# Patient Record
Sex: FEMALE | Race: WHITE | NOT HISPANIC OR LATINO | Employment: OTHER | URBAN - METROPOLITAN AREA
[De-identification: names, ages, dates, MRNs, and addresses within clinical notes are randomized per-mention and may not be internally consistent; named-entity substitution may affect disease eponyms.]

---

## 2017-01-04 ENCOUNTER — PATIENT MESSAGE (OUTPATIENT)
Dept: PSYCHIATRY | Facility: CLINIC | Age: 67
End: 2017-01-04

## 2017-01-13 LAB
EXT ALBUMIN: 3.6
EXT ALKALINE PHOSPHATASE: 69
EXT ALT: 21
EXT AST: 12
EXT BASOPHIL%: 1
EXT BILIRUBIN TOTAL: 0.4
EXT BUN: 27
EXT CALCIUM: 8.6
EXT CHLORIDE: 104
EXT CO2: 26
EXT CREATININE: 1.38 MG/DL
EXT EOSINOPHIL%: 2
EXT GFR MDRD NON AF AMER: 40
EXT GGT: 19
EXT GLUCOSE: 113
EXT HEMATOCRIT: 35.7
EXT HEMOGLOBIN: 11.6
EXT LYMPH%: 23
EXT MONOCYTES%: 13
EXT PLATELETS: 240
EXT POTASSIUM: 4.8
EXT PROTEIN TOTAL: 7
EXT SEGS%: 61
EXT SODIUM: 139 MMOL/L
EXT TACROLIMUS LVL: 4
EXT WBC: 6.3

## 2017-01-16 ENCOUNTER — TELEPHONE (OUTPATIENT)
Dept: TRANSPLANT | Facility: CLINIC | Age: 67
End: 2017-01-16

## 2017-01-16 NOTE — TELEPHONE ENCOUNTER
----- Message from Estefani Olson MD sent at 1/16/2017 10:08 AM CST -----  The Labs are stable - please let patient know.

## 2017-01-17 NOTE — TELEPHONE ENCOUNTER
Pt daughter reports they will f/u with psych and a nutritionist for weight loss in their area. They have not seen anyone as of yet as recommended by Dr. Whiteside. Pt Daughter says she plans to make these appts soon

## 2017-03-06 ENCOUNTER — OFFICE VISIT (OUTPATIENT)
Dept: TRANSPLANT | Facility: CLINIC | Age: 67
End: 2017-03-06
Payer: MEDICARE

## 2017-03-06 VITALS
OXYGEN SATURATION: 99 % | SYSTOLIC BLOOD PRESSURE: 134 MMHG | BODY MASS INDEX: 39.36 KG/M2 | DIASTOLIC BLOOD PRESSURE: 57 MMHG | RESPIRATION RATE: 18 BRPM | HEIGHT: 66 IN | HEART RATE: 70 BPM | WEIGHT: 244.94 LBS | TEMPERATURE: 98 F

## 2017-03-06 DIAGNOSIS — Z94.9 INCISIONAL HERNIA FOLLOWING TRANSPLANT: ICD-10-CM

## 2017-03-06 DIAGNOSIS — Z94.4 S/P LIVER TRANSPLANT: Primary | ICD-10-CM

## 2017-03-06 DIAGNOSIS — Z98.890 S/P MVR (MITRAL VALVE REPAIR): ICD-10-CM

## 2017-03-06 DIAGNOSIS — Z94.4 LIVER REPLACED BY TRANSPLANT: Chronic | ICD-10-CM

## 2017-03-06 DIAGNOSIS — N18.30 CKD (CHRONIC KIDNEY DISEASE) STAGE 3, GFR 30-59 ML/MIN: ICD-10-CM

## 2017-03-06 DIAGNOSIS — Z78.0 ASYMPTOMATIC MENOPAUSAL STATE: ICD-10-CM

## 2017-03-06 DIAGNOSIS — Z29.89 PROPHYLACTIC IMMUNOTHERAPY: Chronic | ICD-10-CM

## 2017-03-06 DIAGNOSIS — K43.2 INCISIONAL HERNIA FOLLOWING TRANSPLANT: ICD-10-CM

## 2017-03-06 PROCEDURE — 1126F AMNT PAIN NOTED NONE PRSNT: CPT | Mod: S$GLB,,, | Performed by: INTERNAL MEDICINE

## 2017-03-06 PROCEDURE — 99999 PR PBB SHADOW E&M-EST. PATIENT-LVL IV: CPT | Mod: PBBFAC,,, | Performed by: INTERNAL MEDICINE

## 2017-03-06 PROCEDURE — 1159F MED LIST DOCD IN RCRD: CPT | Mod: S$GLB,,, | Performed by: INTERNAL MEDICINE

## 2017-03-06 PROCEDURE — 3078F DIAST BP <80 MM HG: CPT | Mod: S$GLB,,, | Performed by: INTERNAL MEDICINE

## 2017-03-06 PROCEDURE — 99215 OFFICE O/P EST HI 40 MIN: CPT | Mod: S$GLB,,, | Performed by: INTERNAL MEDICINE

## 2017-03-06 PROCEDURE — 3075F SYST BP GE 130 - 139MM HG: CPT | Mod: S$GLB,,, | Performed by: INTERNAL MEDICINE

## 2017-03-06 PROCEDURE — 1157F ADVNC CARE PLAN IN RCRD: CPT | Mod: S$GLB,,, | Performed by: INTERNAL MEDICINE

## 2017-03-06 PROCEDURE — 1160F RVW MEDS BY RX/DR IN RCRD: CPT | Mod: S$GLB,,, | Performed by: INTERNAL MEDICINE

## 2017-03-06 RX ORDER — AMOXICILLIN AND CLAVULANATE POTASSIUM 875; 125 MG/1; MG/1
1 TABLET, FILM COATED ORAL EVERY 12 HOURS
COMMUNITY
End: 2017-03-06 | Stop reason: ALTCHOICE

## 2017-03-06 NOTE — MR AVS SNAPSHOT
[facility]  [address]  [facility] [address]  Phone: [phone]                  Evelyn Greenberg   3/6/2017 10:00 AM   Office Visit    Description:  Female : 1950   Provider:  Estefani Olson MD   Department:  [facility]           Reason for Visit     Liver Transplant Follow-up           Diagnoses this Visit        Comments    S/P liver transplant    -  Primary     Asymptomatic menopausal state                To Do List           Goals (5 Years of Data)     Low sodium diet adherence.     Small frequent meals and snacks.       [facility]     [facility] -  Assistance  Registered nurses in the [facility] provide clinical advisement, health education, appointment booking, and other advisory services.  Call for this free service at [phone].             Medications           Message regarding Medications     Verify the changes and/or additions to your medication regime listed below are the same as discussed with your clinician today.  If any of these changes or additions are incorrect, please notify your healthcare provider.        STOP taking these medications     amoxicillin-clavulanate 875-125mg (AUGMENTIN) 875-125 mg per tablet Take 1 tablet by mouth every 12 (twelve) hours.           Verify that the below list of medications is an accurate representation of the medications you are currently taking.  If none reported, the list may be blank. If incorrect, please contact your healthcare provider. Carry this list with you in case of emergency.           Current Medications     ascorbic acid, vitamin C, (VITAMIN C) 500 MG tablet Take 500 mg by mouth once daily.    aspirin (ECOTRIN) 81 MG EC tablet Take 81 mg by mouth every morning.     cyanocobalamin, vitamin B-12, 1,500 mcg TbDL Take 1,500 mcg by mouth once daily.    ERGOCALCIFEROL, VITAMIN D2, (VITAMIN D ORAL) Take 1,000 Units by mouth every morning.     folic  "acid (FOLVITE) 1 MG tablet Take 1 tablet (1 mg total) by mouth once daily.    furosemide (LASIX) 20 MG tablet Take 1 tablet (20 mg total) by mouth once daily.    lisinopril 10 MG tablet Take 1 tablet (10 mg total) by mouth every evening.    magnesium oxide (MAG-OX) 400 mg tablet Take 1 tablet (400 mg total) by mouth once daily.    metoprolol tartrate (LOPRESSOR) 25 MG tablet Take 25 mg by mouth 2 (two) times daily.    multivitamin (THERAGRAN) per tablet Take 1 tablet by mouth every morning.     mycophenolate (CELLCEPT) 250 mg Cap Take 2 capsules (500 mg total) by mouth 2 (two) times daily.    omeprazole (PRILOSEC) 40 MG capsule Take 1 capsule (40 mg total) by mouth once daily.    tacrolimus (PROGRAF) 1 MG Cap Take 2 mg in am and 1 mg in pm           Clinical Reference Information           Your Vitals Were     BP Pulse Temp Resp Height Weight    134/57 (BP Location: Right arm, Patient Position: Sitting, BP Method: Automatic) 70 98.2 °F (36.8 °C) (Oral) 18 5' 6" (1.676 m) 111.1 kg (244 lb 14.9 oz)    SpO2 BMI             99% 39.53 kg/m2         Blood Pressure          Most Recent Value    BP  (!)  134/57      Allergies as of 3/6/2017     No Known Allergies      Immunizations Administered on Date of Encounter - 3/6/2017     None      Orders Placed During Today's Visit     Future Labs/Procedures Expected by Expires    DXA Bone Density Spine And Hip  3/6/2018 3/6/2018      Maintenance Dialysis History     Patient has no recorded history of maintenance dialysis.      Transplant Information        Txp Date Organ Coordinator Care Team    3/25/2014 Liver Ana Hammond RN Surgeon:  Gurmeet Hernandez MD   Referring Physician:  Pako Diez MD   Assisting Surgeon:  Zeeshan Pantoja MD   Current Hepatologist:  Estefani Olson MD   Corresponding Physician:  Pako Diez MD   Current PCP:  Asia Betancourt MD   Corresponding Physician:  Asia Betancourt MD         Instructions    1. Sleep study given fatigue and " snoring  2. Repeat colonoscopy in 2018 (had a polyp)  3. Repeat mammogram later this year  4. Repeat pap regularly as well.  5. Repeat bone density at next visit  6. Annual dermatology appt- need to start  7. Do not take potassium supplements  Return in one year       Language Assistance Services     ATTENTION: Language assistance services are available, free of charge. Please call [phone].      ATENCIÓN: Si habla español, tiene a dahl disposición servicios gratuitos de asistencia lingüística. Llame al [phone].     CHÚ Ý: N?u b?n nói Ti?ng Vi?t, có các d?ch v? h? tr? ngôn ng? mi?n phí dành cho b?n. G?i s? [phone].         [facility] complies with applicable Federal civil rights laws and does not discriminate on the basis of race, color, national origin, age, disability, or sex.

## 2017-03-06 NOTE — PROGRESS NOTES
Transplant Hepatology  Liver Transplant Recipient Follow-up    Transplant Date: 3/25/2014  UNOS Native Liver Dx: Metabolic Disease (METDIS): Alpha-1-Antitrypsin Deficiency (A-1-A)    Evelyn is here for follow up of her liver transplant.    ORGAN: LIVER  Whole or Partial: whole liver  Donor Type:  [facility] High Risk: no  Donor CMV Status: negative  Donor HCV Status: negative  Donor HBcAb: negative  Biliary Anastomosis: end to end  Arterial Anatomy: standard  IVC reconstruction: end to end ivc  Portal vein status: completely thrombosed    She has had the following complications since transplant: incisional hernia. This was repaired with mesh in 09/15 and has now recurred.     Subjective:     Interval History: Evelyn is now 3 years post liver transplant.     She continues on antihypertensives for HTN and has no issues since her mitral valve repair. She was switched from norvasc to lisinopril, but has had no hyperkalemia.    She had an incisional hernia repaired with mesh 09/15. Unfortunately it has recurred and she was seen by the transplant surgeon in Dec 2016 who quoted her a 60% recurrence rate if she had another surgery. Therefore a conservative approach is being followed. She has gained a lot of weight since her   ~1 year ago. Since transplant she is up 65 lbs. Dr Whiteside, transplant surgeon had recommended weight loss and for her to see a psychiatrist. She is opposed to the latter. She is also fatigued and spends her day watching television. She does not wake up refreshed after a nights sleep and does snore.     She had a bone density in  that showed some osteopenia. She is getting regular paps and mammograms and is due again later this year. Colonoscopy is due in 2018 (she previously had a polyp). She is not yet being seen by a dermatologist annually to screen for skin cancer.     She has excellent allograft function (ALT 21, AST 12) and continues to have some chronic renal  insufficiency. Most recent creatinine is 1.38. Prograf level is running 3-4.    Review of Systems   Constitutional: Negative.    HENT: Negative.    Eyes: Negative.    Respiratory: Negative.    Cardiovascular: Negative.    Gastrointestinal: Negative.    Genitourinary: Negative.    Musculoskeletal: Negative.    Skin: Negative.    Neurological: Negative.    Psychiatric/Behavioral: Negative.        Objective:     Physical Exam   Constitutional: She is oriented to person, place, and time. She appears well-developed and well-nourished.   HENT:   Head: Normocephalic and atraumatic.   Eyes: Conjunctivae and EOM are normal. Pupils are equal, round, and reactive to light. No scleral icterus.   Neck: Normal range of motion. Neck supple. No thyromegaly present.   Cardiovascular: Normal rate, regular rhythm and normal heart sounds.    Pulmonary/Chest: Effort normal and breath sounds normal. She has no rales.   Abdominal: Soft. Bowel sounds are normal. She exhibits no distension and no mass. There is no tenderness. A hernia (incisioncal) is present.   Musculoskeletal: Normal range of motion. She exhibits no edema.   Neurological: She is alert and oriented to person, place, and time.   Skin: Skin is warm and dry. No rash noted.   Psychiatric: She has a normal mood and affect.   Vitals reviewed.    Lab Results   Component Value Date    BILITOT 0.7 09/17/2015    AST 25 09/17/2015    ALT 12 09/17/2015    ALKPHOS 81 09/17/2015    CREATININE 1.2 09/17/2015    ALBUMIN 3.0 (L) 09/17/2015     Lab Results   Component Value Date    WBC 10.26 09/17/2015    HGB 10.2 (L) 09/17/2015    HCT 30.9 (L) 09/17/2015    HCT 24 (L) 08/08/2014     09/17/2015     Lab Results   Component Value Date    TACROLIMUS 3.5 (L) 09/17/2015       Assessment/Plan:     The patient is now 3 years post liver transplant. Current recommendations:  1. Complication of liver tx: incisional hernia: continue conservative management; pt encouraged to lose weight  2. HTN,  ongoing: continue antihypertensives  3.R/O osteoporosis. Repeat bone density at next visit  4.  Health maintenance: the patient should see a dermatologist annually to screen for skin cancer, perform regular colonoscopies (next due in 2018), pap tests and mammograms (later this year)  5. CRI, ongoing: continue low dose prograf with a targe tof 3-4. Pt counseled to discontinue the OTC K supplements she is taking especially since she is taking lisinopril.  5. S/p liver transplant and control of IS: continue low dose prograf, target 3-4; continue cellcept 500 mg bid.  6. Fatigue and history of snoring: recommend sleep study.    Return 1 year.    Estefani Olson MD           [facility] Patient Status  Functional Status: 90% - Able to carry on normal activity: minor symptoms of disease  Physical Capacity: No Limitations    .

## 2017-03-06 NOTE — Clinical Note
When finishing her note i realized she is not on anticoagulation after Mitral valve surgery. Other notes had stated the need for long-term coagulation. Do you know?

## 2017-03-15 ENCOUNTER — TELEPHONE (OUTPATIENT)
Dept: TRANSPLANT | Facility: CLINIC | Age: 67
End: 2017-03-15

## 2017-03-15 NOTE — TELEPHONE ENCOUNTER
----- Message from Ana Hammond RN sent at 3/7/2017 12:36 PM CST -----      ----- Message -----     From: Estefani Olson MD     Sent: 3/7/2017   8:01 AM       To: [facility] Post-Liver Transplant Clinical    When finishing her note i realized she is not on anticoagulation after Mitral valve surgery. Other notes had stated the need for long-term coagulation. Do you know?

## 2017-03-15 NOTE — TELEPHONE ENCOUNTER
Will have pt f/u with her cardiologist   Spoke pt daughter no on any coag at this time . Pt is scheduled to see a cardiologist in [place] next week. Reports she will f/u with them

## 2017-03-27 LAB
EXT ALBUMIN: 3.4
EXT ALKALINE PHOSPHATASE: 65
EXT ALT: 17
EXT AST: 12
EXT BASOPHIL%: 1
EXT BILIRUBIN TOTAL: 0.3
EXT BUN: 37
EXT CALCIUM: 8.7
EXT CHLORIDE: 104
EXT CO2: 27
EXT CREATININE: 1.52 MG/DL
EXT EOSINOPHIL%: 2
EXT GGT: 22
EXT GLUCOSE: 111
EXT HEMATOCRIT: 35.4
EXT HEMOGLOBIN: 11.9
EXT LYMPH%: 17
EXT MONOCYTES%: 12
EXT PLATELETS: 291
EXT POTASSIUM: 4.5
EXT PROTEIN TOTAL: 6.9
EXT SEGS%: 69
EXT SODIUM: 139 MMOL/L
EXT TACROLIMUS LVL: 3.9
EXT WBC: 9.9

## 2017-03-30 ENCOUNTER — TELEPHONE (OUTPATIENT)
Dept: TRANSPLANT | Facility: CLINIC | Age: 67
End: 2017-03-30

## 2017-03-30 NOTE — TELEPHONE ENCOUNTER
----- Message from Estefani Olson MD sent at 3/30/2017  4:54 PM CDT -----  The Labs are stable - please let patient know.

## 2017-03-31 ENCOUNTER — TELEPHONE (OUTPATIENT)
Dept: TRANSPLANT | Facility: CLINIC | Age: 67
End: 2017-03-31

## 2017-05-24 DIAGNOSIS — Z94.4 S/P LIVER TRANSPLANT: ICD-10-CM

## 2017-05-24 RX ORDER — TACROLIMUS 1 MG/1
CAPSULE ORAL
Qty: 120 CAPSULE | Refills: 11 | Status: SHIPPED | OUTPATIENT
Start: 2017-05-24 | End: 2017-10-12 | Stop reason: SDUPTHER

## 2017-05-24 NOTE — TELEPHONE ENCOUNTER
----- Message from Karissa Phillips sent at 5/24/2017  1:00 PM CDT -----  Contact: [facility]   Calling to check on refill for patient prograf, please call

## 2017-06-07 ENCOUNTER — PATIENT MESSAGE (OUTPATIENT)
Dept: TRANSPLANT | Facility: CLINIC | Age: 67
End: 2017-06-07

## 2017-06-28 LAB
EXT ALBUMIN: 3.7
EXT ALKALINE PHOSPHATASE: 70
EXT ALT: 26
EXT AST: 17
EXT BASOPHIL%: 0
EXT BILIRUBIN TOTAL: 0.5
EXT BUN: 21
EXT CALCIUM: 9
EXT CO2: 24
EXT CREATININE: 1.38 MG/DL
EXT EOSINOPHIL%: 2
EXT GFR MDRD AF AMER: 40
EXT GFR MDRD NON AF AMER: 40
EXT GGT: 24
EXT GLUCOSE: 146
EXT HEMATOCRIT: 39
EXT HEMOGLOBIN: 12.7
EXT LYMPH%: 22
EXT MONOCYTES%: 12
EXT PLATELETS: 256
EXT POTASSIUM: 4.2
EXT PROTEIN TOTAL: 102
EXT SEGS%: 64
EXT SODIUM: 138 MMOL/L
EXT TACROLIMUS LVL: 5.8
EXT WBC: 7.2

## 2017-07-10 ENCOUNTER — TELEPHONE (OUTPATIENT)
Dept: TRANSPLANT | Facility: CLINIC | Age: 67
End: 2017-07-10

## 2017-07-10 NOTE — TELEPHONE ENCOUNTER
----- Message from Estefani Olson MD sent at 7/5/2017 10:12 PM CDT -----  The Labs are stable - please let patient know.

## 2017-09-05 LAB
EXT ALBUMIN: 3.8
EXT ALKALINE PHOSPHATASE: 72
EXT ALT: 36
EXT AST: 19
EXT BASOPHIL%: 1
EXT BILIRUBIN TOTAL: 0.5
EXT BUN: 23
EXT CALCIUM: 9.1
EXT CHLORIDE: 102
EXT CO2: 27
EXT CREATININE: 1.41 MG/DL
EXT EOSINOPHIL%: 3
EXT GGT: 24
EXT GLUCOSE: 153
EXT HEMATOCRIT: 37.6
EXT HEMOGLOBIN: 12.6
EXT LYMPH%: 22
EXT MONOCYTES%: 12
EXT PLATELETS: 226
EXT POTASSIUM: 4.3
EXT PROTEIN TOTAL: 7.4
EXT SEGS%: 63
EXT SODIUM: 138 MMOL/L
EXT TACROLIMUS LVL: 5.1
EXT WBC: 6.9

## 2017-09-07 ENCOUNTER — TELEPHONE (OUTPATIENT)
Dept: TRANSPLANT | Facility: CLINIC | Age: 67
End: 2017-09-07

## 2017-09-07 NOTE — TELEPHONE ENCOUNTER
----- Message from Estefani Olson MD sent at 9/7/2017  1:34 PM CDT -----  The Labs are stable - please let patient know.

## 2017-09-22 ENCOUNTER — TELEPHONE (OUTPATIENT)
Dept: TRANSPLANT | Facility: CLINIC | Age: 67
End: 2017-09-22

## 2017-09-22 ENCOUNTER — PATIENT MESSAGE (OUTPATIENT)
Dept: TRANSPLANT | Facility: CLINIC | Age: 67
End: 2017-09-22

## 2017-09-22 NOTE — TELEPHONE ENCOUNTER
Spoke with Dr. Olson in regards to pt symptoms she advised pt to report to her local ER since she is 5 hrs away to be evaluated. Pt daughter Keisha agreed with plan

## 2017-09-22 NOTE — TELEPHONE ENCOUNTER
Pt daughter Keisha called to report that he mother has been gaining lots of weight and retaining fluid in her lower extremities and reports she looks juandice.  Will discuss with Dr. Olson.will have pt come in to be seen. Labs earlier this month were stable. Pt will follow up with local GI Dr. Diez as well. Requesting appt  I n next two weeks as they are 5 hrs away

## 2017-10-12 DIAGNOSIS — Z94.4 S/P LIVER TRANSPLANT: ICD-10-CM

## 2017-10-15 RX ORDER — TACROLIMUS 1 MG/1
CAPSULE ORAL
Qty: 120 CAPSULE | Refills: 11 | Status: SHIPPED | OUTPATIENT
Start: 2017-10-15 | End: 2018-01-01 | Stop reason: SDUPTHER

## 2017-10-31 ENCOUNTER — PATIENT MESSAGE (OUTPATIENT)
Dept: TRANSPLANT | Facility: CLINIC | Age: 67
End: 2017-10-31

## 2017-11-21 ENCOUNTER — PATIENT MESSAGE (OUTPATIENT)
Dept: TRANSPLANT | Facility: CLINIC | Age: 67
End: 2017-11-21

## 2017-11-21 ENCOUNTER — TELEPHONE (OUTPATIENT)
Dept: TRANSPLANT | Facility: CLINIC | Age: 67
End: 2017-11-21

## 2017-11-21 NOTE — TELEPHONE ENCOUNTER
----- Message from Karissa Phillips sent at 11/21/2017  1:37 PM CST -----  Contact: patient   Patient returning call to [facility].         Please call     Thanks!

## 2017-12-06 LAB
EXT ALBUMIN: 3.7
EXT ALKALINE PHOSPHATASE: 80
EXT ALT: 28
EXT AST: 18
EXT BASOPHIL%: 1
EXT BILIRUBIN TOTAL: 0.7
EXT BUN: 16
EXT CALCIUM: 9.2
EXT CHLORIDE: 102
EXT CO2: 31
EXT CREATININE: 1.09 MG/DL
EXT EOSINOPHIL%: 3
EXT GFR MDRD AF AMER: 53
EXT GGT: 18
EXT GLUCOSE: 136
EXT HEMATOCRIT: 34.4
EXT HEMOGLOBIN: 11.4
EXT LYMPH%: 24
EXT MONOCYTES%: 13
EXT PLATELETS: 242
EXT POTASSIUM: 3.8
EXT PROTEIN TOTAL: 7.2
EXT SEGS%: 59
EXT SODIUM: 141 MMOL/L
EXT TACROLIMUS LVL: NORMAL
EXT WBC: 6

## 2017-12-08 ENCOUNTER — TELEPHONE (OUTPATIENT)
Dept: TRANSPLANT | Facility: CLINIC | Age: 67
End: 2017-12-08

## 2017-12-08 NOTE — TELEPHONE ENCOUNTER
----- Message from Estefani Olson MD sent at 12/7/2017  1:55 PM CST -----  The Labs are stable - please let patient know.

## 2018-01-01 ENCOUNTER — TELEPHONE (OUTPATIENT)
Dept: TRANSPLANT | Facility: CLINIC | Age: 68
End: 2018-01-01

## 2018-01-01 ENCOUNTER — PATIENT MESSAGE (OUTPATIENT)
Dept: NEUROLOGY | Facility: CLINIC | Age: 68
End: 2018-01-01

## 2018-01-01 ENCOUNTER — OFFICE VISIT (OUTPATIENT)
Dept: TRANSPLANT | Facility: CLINIC | Age: 68
End: 2018-01-01
Attending: PEDIATRICS
Payer: MEDICARE

## 2018-01-01 ENCOUNTER — TELEPHONE (OUTPATIENT)
Dept: NEUROLOGY | Facility: CLINIC | Age: 68
End: 2018-01-01

## 2018-01-01 ENCOUNTER — PATIENT MESSAGE (OUTPATIENT)
Dept: TRANSPLANT | Facility: CLINIC | Age: 68
End: 2018-01-01

## 2018-01-01 ENCOUNTER — OFFICE VISIT (OUTPATIENT)
Dept: NEUROLOGY | Facility: CLINIC | Age: 68
End: 2018-01-01
Payer: MEDICARE

## 2018-01-01 VITALS
HEART RATE: 82 BPM | HEIGHT: 65 IN | DIASTOLIC BLOOD PRESSURE: 85 MMHG | BODY MASS INDEX: 41.87 KG/M2 | SYSTOLIC BLOOD PRESSURE: 140 MMHG | WEIGHT: 251.31 LBS

## 2018-01-01 VITALS
BODY MASS INDEX: 42.06 KG/M2 | WEIGHT: 252.44 LBS | RESPIRATION RATE: 16 BRPM | HEART RATE: 67 BPM | TEMPERATURE: 97 F | SYSTOLIC BLOOD PRESSURE: 129 MMHG | HEIGHT: 65 IN | OXYGEN SATURATION: 96 % | DIASTOLIC BLOOD PRESSURE: 65 MMHG

## 2018-01-01 DIAGNOSIS — R13.12 OROPHARYNGEAL DYSPHAGIA: ICD-10-CM

## 2018-01-01 DIAGNOSIS — G23.1 PSP (PROGRESSIVE SUPRANUCLEAR PALSY): ICD-10-CM

## 2018-01-01 DIAGNOSIS — G23.1 PSP (PROGRESSIVE SUPRANUCLEAR PALSY): Primary | ICD-10-CM

## 2018-01-01 DIAGNOSIS — N18.30 CKD (CHRONIC KIDNEY DISEASE) STAGE 3, GFR 30-59 ML/MIN: ICD-10-CM

## 2018-01-01 DIAGNOSIS — K43.2 INCISIONAL HERNIA FOLLOWING TRANSPLANT: ICD-10-CM

## 2018-01-01 DIAGNOSIS — Z94.4 LIVER REPLACED BY TRANSPLANT: Chronic | ICD-10-CM

## 2018-01-01 DIAGNOSIS — F48.2 PSEUDOBULBAR AFFECT: ICD-10-CM

## 2018-01-01 DIAGNOSIS — Z29.89 PROPHYLACTIC IMMUNOTHERAPY: Primary | Chronic | ICD-10-CM

## 2018-01-01 DIAGNOSIS — Z79.60 LONG-TERM USE OF IMMUNOSUPPRESSANT MEDICATION: ICD-10-CM

## 2018-01-01 DIAGNOSIS — Z94.4 S/P LIVER TRANSPLANT: ICD-10-CM

## 2018-01-01 DIAGNOSIS — G25.81 RLS (RESTLESS LEGS SYNDROME): ICD-10-CM

## 2018-01-01 DIAGNOSIS — Z94.9 INCISIONAL HERNIA FOLLOWING TRANSPLANT: ICD-10-CM

## 2018-01-01 LAB
EXT ALBUMIN: 3.6
EXT ALBUMIN: 3.9
EXT ALKALINE PHOSPHATASE: 73
EXT ALKALINE PHOSPHATASE: 82
EXT ALT: 22
EXT ALT: 39
EXT AST: 15
EXT AST: 24
EXT BASOPHIL%: 0
EXT BASOPHIL%: 1
EXT BILIRUBIN TOTAL: 0.42
EXT BILIRUBIN TOTAL: 0.48
EXT BUN: 22
EXT BUN: 24
EXT CALCIUM: 9.1
EXT CALCIUM: 9.2
EXT CHLORIDE: 100
EXT CHLORIDE: 101
EXT CO2: 25
EXT CO2: 27
EXT CREATININE: 1.12 MG/DL
EXT CREATININE: 1.34 MG/DL
EXT EOSINOPHIL%: 2
EXT EOSINOPHIL%: 2
EXT GFR MDRD AF AMER: 42
EXT GFR MDRD AF AMER: 51
EXT GFR MDRD NON AF AMER: 42
EXT GFR MDRD NON AF AMER: 51
EXT GGT: 26
EXT GGT: 37
EXT GLUCOSE: 164
EXT GLUCOSE: 187
EXT HEMATOCRIT: 35.9
EXT HEMATOCRIT: 38.1
EXT HEMOGLOBIN: 11.8
EXT HEMOGLOBIN: 12.5
EXT LYMPH%: 18
EXT LYMPH%: 18
EXT MONOCYTES%: 10
EXT MONOCYTES%: 11
EXT PLATELETS: 264
EXT PLATELETS: 303
EXT POTASSIUM: 4.6
EXT POTASSIUM: 4.7
EXT PROTEIN TOTAL: 7.2
EXT PROTEIN TOTAL: 7.7
EXT SEGS%: 69
EXT SEGS%: 70
EXT SODIUM: 137 MMOL/L
EXT SODIUM: 138 MMOL/L
EXT TACROLIMUS LVL: 5
EXT TACROLIMUS LVL: 6
EXT WBC: 9.5
EXT WBC: 9.5

## 2018-01-01 PROCEDURE — 3078F DIAST BP <80 MM HG: CPT | Mod: CPTII,S$GLB,, | Performed by: INTERNAL MEDICINE

## 2018-01-01 PROCEDURE — 99214 OFFICE O/P EST MOD 30 MIN: CPT | Mod: S$GLB,,, | Performed by: INTERNAL MEDICINE

## 2018-01-01 PROCEDURE — 3079F DIAST BP 80-89 MM HG: CPT | Mod: CPTII,S$GLB,, | Performed by: PSYCHIATRY & NEUROLOGY

## 2018-01-01 PROCEDURE — 3074F SYST BP LT 130 MM HG: CPT | Mod: CPTII,S$GLB,, | Performed by: INTERNAL MEDICINE

## 2018-01-01 PROCEDURE — 99214 OFFICE O/P EST MOD 30 MIN: CPT | Mod: S$GLB,,, | Performed by: PSYCHIATRY & NEUROLOGY

## 2018-01-01 PROCEDURE — 3077F SYST BP >= 140 MM HG: CPT | Mod: CPTII,S$GLB,, | Performed by: PSYCHIATRY & NEUROLOGY

## 2018-01-01 PROCEDURE — 99999 PR PBB SHADOW E&M-EST. PATIENT-LVL III: CPT | Mod: PBBFAC,,, | Performed by: PSYCHIATRY & NEUROLOGY

## 2018-01-01 RX ORDER — GABAPENTIN 300 MG/1
300 CAPSULE ORAL 3 TIMES DAILY PRN
Qty: 90 CAPSULE | Refills: 11 | Status: SHIPPED | OUTPATIENT
Start: 2018-01-01 | End: 2019-12-19

## 2018-01-01 RX ORDER — TACROLIMUS 1 MG/1
CAPSULE ORAL
Qty: 90 CAPSULE | Refills: 11 | Status: SHIPPED | OUTPATIENT
Start: 2018-01-01

## 2018-01-05 ENCOUNTER — TELEPHONE (OUTPATIENT)
Dept: TRANSPLANT | Facility: CLINIC | Age: 68
End: 2018-01-05

## 2018-01-05 NOTE — TELEPHONE ENCOUNTER
----- Message from Juan Lynn sent at 1/5/2018  2:31 PM CST -----  Contact: Pt daughter-Dillan  Pt daughter would like to know if Narciso can help find the pt a physcian.           Call back number: [phone]

## 2018-01-05 NOTE — TELEPHONE ENCOUNTER
Pt daughter called requesting information on a neuro physician for evaluation for her mother's meomory  She has been very forgetful. Supplied pt daughter with phone number to neuro appt line . Will f/u

## 2018-03-12 ENCOUNTER — OFFICE VISIT (OUTPATIENT)
Dept: TRANSPLANT | Facility: CLINIC | Age: 68
End: 2018-03-12
Payer: MEDICARE

## 2018-03-12 VITALS
SYSTOLIC BLOOD PRESSURE: 156 MMHG | OXYGEN SATURATION: 99 % | TEMPERATURE: 98 F | WEIGHT: 244.25 LBS | BODY MASS INDEX: 40.69 KG/M2 | RESPIRATION RATE: 18 BRPM | HEART RATE: 81 BPM | DIASTOLIC BLOOD PRESSURE: 75 MMHG | HEIGHT: 65 IN
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
## 2018-03-12 DIAGNOSIS — G47.51 CONFUSIONAL AROUSALS: ICD-10-CM

## 2018-03-12 DIAGNOSIS — Z29.89 PROPHYLACTIC IMMUNOTHERAPY: Chronic | ICD-10-CM

## 2018-03-12 DIAGNOSIS — Z94.4 LIVER REPLACED BY TRANSPLANT: Chronic | ICD-10-CM

## 2018-03-12 DIAGNOSIS — R06.83 SNORING: Primary | ICD-10-CM

## 2018-03-12 PROCEDURE — 3077F SYST BP >= 140 MM HG: CPT | Mod: CPTII,S$GLB,, | Performed by: INTERNAL MEDICINE

## 2018-03-12 PROCEDURE — 99999 PR PBB SHADOW E&M-EST. PATIENT-LVL IV: CPT | Mod: PBBFAC,,, | Performed by: INTERNAL MEDICINE

## 2018-03-12 PROCEDURE — 3078F DIAST BP <80 MM HG: CPT | Mod: CPTII,S$GLB,, | Performed by: INTERNAL MEDICINE

## 2018-03-12 PROCEDURE — 99215 OFFICE O/P EST HI 40 MIN: CPT | Mod: S$GLB,,, | Performed by: INTERNAL MEDICINE

## 2018-03-12 RX ORDER — AMOXICILLIN 500 MG
1 CAPSULE ORAL DAILY
COMMUNITY

## 2018-03-12 RX ORDER — AMLODIPINE BESYLATE 10 MG/1
10 TABLET ORAL DAILY
COMMUNITY

## 2018-03-12 RX ORDER — VENLAFAXINE HYDROCHLORIDE 37.5 MG/1
37.5 CAPSULE, EXTENDED RELEASE ORAL DAILY
COMMUNITY
Start: 2018-03-08

## 2018-03-12 NOTE — PATIENT INSTRUCTIONS
1. See cardiologist and explain she is falling. Recommend cardiac w/u including holter  2. Ok for rotator cuff surgery from liver perspective  3. neurology appt at [facility]; get neurology notes from [place] neurologist  4. Sleep study  5. Dermatologist annual to screen for skin cancer  6. Keep up with mammogram and pap tests  7. Colonoscopy - due this year- do with Dr Beavers (polyps on colonoscopy 2012)  Return 8-12 weeks    [phone]

## 2018-03-12 NOTE — PROGRESS NOTES
Transplant Hepatology  Liver Transplant Recipient Follow-up    Transplant Date: 3/25/2014  UNOS Native Liver Dx: Metabolic Disease (METDIS): Alpha-1-Antitrypsin Deficiency (A-1-A)    Evelyn is here for follow up of her liver transplant.    ORGAN: LIVER  Whole or Partial: whole liver  Donor Type:  - brain death  [facility] High Risk: no  Donor CMV Status: negative  Donor HCV Status: negative  Donor HBcAb: negative  Biliary Anastomosis: end to end  Arterial Anatomy: standard  IVC reconstruction: end to end ivc  Portal vein status: completely thrombosed    She has had the following complications since transplant: incisional hernia. This was repaired with mesh in 09/15 and has now recurred.     Subjective:     Interval History: Evelyn is now 3 years post liver transplant.     She continues on antihypertensives for HTN and has no issues since her mitral valve repair. She was switched from norvasc to lisinopril, but has had no hyperkalemia.    Two family members were present. They are concerned about her slurred speech and lack of energy. She sleeps most of the day. She does not wake up refreshed after a nights sleep and does snore. She is also falling. The etiology is not clear. She has seen a neurologist locally but the family would like a second opinion. She needs rotator cuff surgery.    She had a bone density in 2015 that showed some osteopenia. She is getting regular paps and mammograms. Colonoscopy is due in 2018 (she previously had a polyp). She is not yet being seen by a dermatologist annually to screen for skin cancer.     She has excellent allograft function (ALT 28, AST 18). Most recent creatinine is 1.09. Prograf level is running 3-5.    Review of Systems   Constitutional: Negative.    HENT: Negative.    Eyes: Negative.    Respiratory: Negative.    Cardiovascular: Negative.    Gastrointestinal: Negative.    Genitourinary: Negative.    Musculoskeletal: Negative.    Skin: Negative.    Neurological:  Negative.    Psychiatric/Behavioral: Negative.        Objective:     Physical Exam   Constitutional: She is oriented to person, place, and time. She appears well-developed and well-nourished.   HENT:   Head: Normocephalic and atraumatic.   Eyes: Conjunctivae and EOM are normal. Pupils are equal, round, and reactive to light. No scleral icterus.   Neck: Normal range of motion. Neck supple. No thyromegaly present.   Cardiovascular: Normal rate, regular rhythm and normal heart sounds.    Pulmonary/Chest: Effort normal and breath sounds normal. She has no rales.   Abdominal: Soft. Bowel sounds are normal. She exhibits no distension and no mass. There is no tenderness. A hernia (incisioncal) is present.   Musculoskeletal: Normal range of motion. She exhibits no edema.   Neurological: She is alert and oriented to person, place, and time.   Skin: Skin is warm and dry. No rash noted.   Psychiatric: She has a normal mood and affect.   Vitals reviewed.    Lab Results   Component Value Date    BILITOT 0.7 09/17/2015    AST 25 09/17/2015    ALT 12 09/17/2015    ALKPHOS 81 09/17/2015    CREATININE 1.2 09/17/2015    ALBUMIN 3.0 (L) 09/17/2015     Lab Results   Component Value Date    WBC 10.26 09/17/2015    HGB 10.2 (L) 09/17/2015    HCT 30.9 (L) 09/17/2015    HCT 24 (L) 08/08/2014     09/17/2015     Lab Results   Component Value Date    TACROLIMUS 3.5 (L) 09/17/2015       Assessment/Plan:     The patient is now 3 years post liver transplant. Current recommendations:  1. Complication of liver tx: incisional hernia: continue conservative management; pt encouraged to lose weight  2. HTN, ongoing: continue antihypertensives  3.R/O osteoporosis. Repeat bone density at next visit  4.  Health maintenance: the patient should see a dermatologist annually to screen for skin cancer, perform regular colonoscopies (due now), pap tests and mammograms (this year)  5. CRI, ongoing: continue low dose prograf with a targe tof 3-5.   5. S/p  liver transplant and control of IS: continue low dose prograf, target 3-4; continue cellcept 500 mg bid.  6. Fatigue and history of snoring: recommend sleep study.  7. Falls, slurred speech- to see neurologist at [facility]; cardiology w/u including holter monitor    Return 8-12 weeks.    Estefani Olson MD           [facility] Patient Status  Functional Status: 90% - Able to carry on normal activity: minor symptoms of disease  Physical Capacity: No Limitations    . .

## 2018-03-12 NOTE — LETTER
March 18, 2018        Pako Diez  [address] [address]  Phone: [phone]     Asia Betancourt  [address] [address]  Phone: [phone]  Fax: [phone]             [facility]  [address]  [facility] [address]  Phone: [phone]   Patient: Evelyn Greenberg   MR Number: [id number]   YOB: 1950   Date of Visit: 3/12/2018       Dear Dr. Pako Diez, Asia Betancourt    Thank you for referring Evelyn Greenberg to me for evaluation. Attached you will find relevant portions of my assessment and plan of care.    If you have questions, please do not hesitate to call me. I look forward to following Evelyn Greenberg along with you.    Sincerely,    Estefani Olson MD    Enclosure    If you would like to receive this communication electronically, please contact [email or web address] or [phone] to request [employer] access.    [employer] is a tool which provides read-only access to select patient information with whom you have a relationship. Its easy to use and provides real time access to review your patients record including encounter summaries, notes, results, and demographic information.    If you feel you have received this communication in error or would no longer like to receive these types of communications, please e-mail [email or web address]

## 2018-03-13 ENCOUNTER — OFFICE VISIT (OUTPATIENT)
Dept: NEUROLOGY | Facility: CLINIC | Age: 68
End: 2018-03-13
Payer: MEDICARE

## 2018-03-13 ENCOUNTER — TELEPHONE (OUTPATIENT)
Dept: NEUROLOGY | Facility: CLINIC | Age: 68
End: 2018-03-13

## 2018-03-13 ENCOUNTER — INITIAL CONSULT (OUTPATIENT)
Dept: NEUROLOGY | Facility: CLINIC | Age: 68
End: 2018-03-13
Payer: MEDICARE

## 2018-03-13 ENCOUNTER — HOSPITAL ENCOUNTER (OUTPATIENT)
Dept: RADIOLOGY | Facility: HOSPITAL | Age: 68
Discharge: HOME | End: 2018-03-13
Attending: CLINICAL NEUROPSYCHOLOGIST
Payer: MEDICARE

## 2018-03-13 DIAGNOSIS — I63.9 CEREBROVASCULAR ACCIDENT (CVA), UNSPECIFIED MECHANISM: Primary | ICD-10-CM

## 2018-03-13 DIAGNOSIS — G23.1 PSP (PROGRESSIVE SUPRANUCLEAR PALSY): ICD-10-CM

## 2018-03-13 DIAGNOSIS — R41.3 MEMORY LOSS: ICD-10-CM

## 2018-03-13 DIAGNOSIS — I63.9 CEREBROVASCULAR ACCIDENT (CVA), UNSPECIFIED MECHANISM: ICD-10-CM

## 2018-03-13 DIAGNOSIS — R13.12 OROPHARYNGEAL DYSPHAGIA: ICD-10-CM

## 2018-03-13 DIAGNOSIS — F02.80 MAJOR NEUROCOGNITIVE DISORDER DUE TO ANOTHER MEDICAL CONDITION: ICD-10-CM

## 2018-03-13 DIAGNOSIS — F48.2 PSEUDOBULBAR AFFECT: ICD-10-CM

## 2018-03-13 DIAGNOSIS — F32.A DEPRESSION, UNSPECIFIED DEPRESSION TYPE: ICD-10-CM

## 2018-03-13 PROCEDURE — 70551 MRI BRAIN STEM W/O DYE: CPT | Mod: 26,,, | Performed by: RADIOLOGY

## 2018-03-13 PROCEDURE — 3077F SYST BP >= 140 MM HG: CPT | Mod: CPTII,S$GLB,, | Performed by: PSYCHIATRY & NEUROLOGY

## 2018-03-13 PROCEDURE — 99999 PR PBB SHADOW E&M-EST. PATIENT-LVL I: CPT | Mod: PBBFAC,,, | Performed by: CLINICAL NEUROPSYCHOLOGIST

## 2018-03-13 PROCEDURE — 96118 PR NEUROPSYCH TESTING BY PSYCH/PHYS: CPT | Mod: S$GLB,,, | Performed by: CLINICAL NEUROPSYCHOLOGIST

## 2018-03-13 PROCEDURE — 99204 OFFICE O/P NEW MOD 45 MIN: CPT | Mod: S$GLB,,, | Performed by: PSYCHIATRY & NEUROLOGY

## 2018-03-13 PROCEDURE — 70551 MRI BRAIN STEM W/O DYE: CPT | Mod: TC

## 2018-03-13 PROCEDURE — 99499 UNLISTED E&M SERVICE: CPT | Mod: S$GLB,,, | Performed by: CLINICAL NEUROPSYCHOLOGIST

## 2018-03-13 PROCEDURE — 3080F DIAST BP >= 90 MM HG: CPT | Mod: CPTII,S$GLB,, | Performed by: PSYCHIATRY & NEUROLOGY

## 2018-03-13 PROCEDURE — 90791 PSYCH DIAGNOSTIC EVALUATION: CPT | Mod: S$GLB,,, | Performed by: CLINICAL NEUROPSYCHOLOGIST

## 2018-03-13 PROCEDURE — 99999 PR PBB SHADOW E&M-EST. PATIENT-LVL II: CPT | Mod: PBBFAC,,, | Performed by: PSYCHIATRY & NEUROLOGY

## 2018-03-13 RX ORDER — CARBIDOPA AND LEVODOPA 25; 100 MG/1; MG/1
1 TABLET ORAL 3 TIMES DAILY
Qty: 270 TABLET | Refills: 3 | Status: SHIPPED | OUTPATIENT
Start: 2018-03-13

## 2018-03-13 NOTE — PROGRESS NOTES
Outpatient Neuropsychological Evaluation    Referral Information  Name: Evelyn Greenberg  MRN: [id number]  AUGUSTIN: 2018  : 1950  Age: 67 y.o.  Handedness: Right  Race: White  Gender: female  Referring Provider: Stefania Buckner [facility] [address]  [place] [id number]  Referral Reason/Medical Necessity: Patient has multiple medical co-morbidities (review below) and her family has noticed ongoing problems with cognition and motor functioning (falls, asymmetric weakness). She was referred for a neuropsychological evaluation by Neurology to characterize her cognitive status, for differential diagnosis, and for treatment recommendations.   Billing:Total licensed neuropsychologists professional time includes: clinical interview (88534: 60-minutes), test administration and interpretation of tests administered by the billing neuropsychologist, integration of test results and other clinical data, preparing the final report, and personally reporting results to the patient ([id number])= 4 hours.   Consent: The patient expressed an understanding of the purpose of the evaluation and consented to all procedures.    Current Symptoms/HPI    Note: Patient has trouble providing a clear history and her daughters provided background information.    Current Cognitive Sxs:  · Attention:   · Per Daughters: Significant trouble with attention and focus.  · Mental Speed:  · Per Daughters: Significantly slowed mental speed.  · Memory:  · Per Daughters:  Over the last year or so, her memory has worsened quite a bit. Examples: forgetting medications, conversations, appointments, less precise memory for details. There is a lot of repeating of stories over and over, per her daughter.  · Language:  · Per Daughters: Significant trouble understanding what her daughters/medical providers are saying. Onset was 2 years ago with a worsening over time. Speech has gradually slowed down with intermittent episodes of dysarthria/slurring of  speech. Handwriting is also poorer than it was before.  · Visuospatial/Perceptual:  · Unclear per family  · Executive Functioning:  · Per Daughters: Decline over the last few years for reasoning/problem solving. She has made some atypical decisions in the last 2 years where she has re-furnished her house twice for no apparent reason.    [Onset/Course]:  Prior to her  liver transplant, she was having some mild difficulty with memory. 12-months post-transplant her memory improved, but never back to baseline. In  time frame, her  was quite ill and  resulting in considerable grief/loss/depression that has not really improved much (review description below). Around the same, however, she began having slowed/slurred speech, poorer quality handwriting, and some trouble with comprehension. Additionally, her processing speed slowed down considerably. Short-term memory functions declined quite a bit in the last year and have continued to decline along with her attentiveness in conversation and reasoning/problem solving skills. Over the last year, she started having frequent falls and she had significant difficulty using her left side (review below). She saw a Neurologist in [place] recently and her MRI was negative for stroke. Her children continue to be quite worried about her cognitive/physical functioning along with her mood.     Neuropsychiatric Sxs:  · Mood:   · Depression: After her  , significant depression persisted beyond typical period for grief. She was placed on anti-depressant, but that just improved her ongoing tearfulness. She continues to be withdrawn, inactive, and dysphoric per daughters.   · Irritability: Her daughters report that she is more irritable, short-tempered, says meaner things, is resistant to any feedback, and has reduced insight.   · Anxiety: None  · Pseudobulbar Affect: Daughters report inappropriate bursts of laughter   · Neurovegetative:  · Sleep: Falls  "asleep fine, but gets up 4-5x nightly to urinate. She sleeps a lot during the day now.    · Appetite: Normal  · Energy: "None"  · Behavioral Concerns:   · Refurnished the entire house after her  . Then, re-did it again several months later.  · Some giving money more than she used to.   · Delusions: Thinks that her  is in the house each evening and this started after he . She is aware that it isn't real.   · Hallucinations: None  · SI/HI: Denied    Physical  · Motor:   · No tremors.  · 7 falls since 2018.  · Left side seems weaker than her right. Imaging has been negative for stroke. She has reduced dexterity. Onset of motor issues began in the last year with a worsening over time.  · Sensory:  · No issues  · Pain:  · None aisde from shoulder    Current Functioning (I/ADLs):  · ADLs:  She has a friend who helps her with many tasks given her motor difficulties now.   · IADLs: Largely dependent  · Finances: Has a lot of automatic pay, but her daughter monitors.  · Medication Mgmt: She forgets and her daughters remind her.  · Driving: She doesn't drive  · Household Mgmt: None since last year or two.     Family History   Problem Relation Age of Onset    Cirrhosis Mother     COPD Father     Heart disease Father     Breast cancer Neg Hx     Ovarian cancer Neg Hx      Family Neurologic History: Negative for heritable risk factors  Family Psychiatric History: Depression    Developmental/Academic Hx:  Developmental: No gestational or later developmental concerns.  Academic:  · Learning Difficulties: None  · Attention Difficulties: None  · Behavioral Difficulties: None  · Educational Attainment: HS (average)    Social/Occupational Hx:  Social:  · Current Relationship/Family Status:  for 46 years +   2.5 years ago due to liver cirrhosis 2/2 alcoholism + close to her children   · Primary Source of Support: Family  · Current Hobbies: Minimally engaged now  · Stressors: Her "   2.5 years ago    Occupational Hx:  · Occupational Status: Retired 20 years ago to spend time with  who worked out of a state  · Primary Occupation(s): Worked as an  for a [employer] and  at the station    MEDICAL HISTORY  Patient Active Problem List   Diagnosis    Hypertension    Obesity    Anemia aplastic aregenerative    Diastolic dysfunction    Pulmonary hypertension    Prophylactic immunotherapy (transplant immunosuppression)    Liver transplant 3/25/2014 for Alpha 1    Alpha-1-antitrypsin deficiency    Hypertensive cardiovascular disease    Other chronic pulmonary heart diseases    Long term (current) use of anticoagulants    Acute on chronic diastolic CHF (congestive heart failure)    Hyperglycemia    S/P MVR (mitral valve repair)    Incisional hernia following transplant    CKD (chronic kidney disease) stage 3, GFR 30-59 ml/min    Long-term use of immunosuppressant medication    Portal vein thrombosis     Past Medical History:   Diagnosis Date    Acute on chronic diastolic CHF (congestive heart failure) 2014    Atrial fibrillation     Blood transfusion     Cirrhosis     CKD (chronic kidney disease) stage 3, GFR 30-59 ml/min 3/30/2015    Diastolic dysfunction 2013    Diverticulitis     Edema     Elevated liver enzymes     Hypertension     Immunosuppressed status 3/30/2015    Liver cirrhosis secondary to DYER     Alpha-1 antitrypsin heterozygote     Mitral regurgitation     PONV (postoperative nausea and vomiting)     Pulmonary hypertension 2013    Respiratory distress 4/10/2014    Varices, esophageal      Past Surgical History:   Procedure Laterality Date    BOWEL RESECTION      BREAST SURGERY      CARDIAC VALVE SURGERY      CHOLECYSTECTOMY      COLON SURGERY      HERNIA REPAIR      HYSTERECTOMY      LIVER TRANSPLANT  3/25/2014    MITRAL VALVE REPAIR      OOPHORECTOMY       Neurologic History  · TBI:    None  · Seizures:  None  · Stroke:  Unclear  · Movement Disorder: None diagnosed prior to my evaluation. Afterward, I recommended a same day MRI and a Neurology Appointment. She has been just diagnosed (03/13/18) with PSP by our Movement Disorders team.      No results found for: [id number]  Lab Results   Component Value Date    RPR Non-Reactive 01/02/2013     No results found for: FOLATE  Lab Results   Component Value Date    TSH 3.985 04/06/2014     Lab Results   Component Value Date    HGBA1C 5.1 08/07/2014     Lab Results   Component Value Date    HIV1X2 Negative 01/02/2013     Neurodiagnostics  03/13/18 Brain MRI [Per Neurology]  Mild general atrophy, moderate midline cerebellar atrophy.  Midbrain is normal.      Current Outpatient Prescriptions:     amLODIPine (NORVASC) 10 MG tablet, Take 10 mg by mouth once daily., Disp: , Rfl:     ascorbic acid, vitamin C, (VITAMIN C) 500 MG tablet, Take 500 mg by mouth once daily., Disp: , Rfl:     aspirin (ECOTRIN) 81 MG EC tablet, Take 81 mg by mouth every morning. , Disp: , Rfl:     cyanocobalamin, vitamin B-12, 1,500 mcg TbDL, Take 1,500 mcg by mouth once daily. (Patient taking differently: Take 1,500 mcg by mouth every morning. ), Disp: , Rfl:     ERGOCALCIFEROL, VITAMIN D2, (VITAMIN D ORAL), Take 1,000 Units by mouth every morning. , Disp: , Rfl:     fish oil-omega-3 fatty acids 300-1,000 mg capsule, Take 1 capsule by mouth once daily., Disp: , Rfl:     folic acid (FOLVITE) 1 MG tablet, Take 1 tablet (1 mg total) by mouth once daily. (Patient taking differently: Take 1 mg by mouth every morning. ), Disp: 30 tablet, Rfl: 5    furosemide (LASIX) 20 MG tablet, Take 1 tablet (20 mg total) by mouth once daily., Disp: 30 tablet, Rfl: 1    lisinopril 10 MG tablet, Take 1 tablet (10 mg total) by mouth every evening., Disp: 30 tablet, Rfl: 11    magnesium oxide (MAG-OX) 400 mg tablet, Take 1 tablet (400 mg total) by mouth once daily. (Patient taking differently:  "Take 400 mg by mouth every morning. ), Disp: 30 tablet, Rfl: 3    metoprolol tartrate (LOPRESSOR) 25 MG tablet, Take 25 mg by mouth 2 (two) times daily., Disp: , Rfl:     multivitamin (THERAGRAN) per tablet, Take 1 tablet by mouth every morning. , Disp: , Rfl:     mycophenolate (CELLCEPT) 250 mg Cap, Take 2 capsules (500 mg total) by mouth 2 (two) times daily., Disp: 120 capsule, Rfl: 11    omeprazole (PRILOSEC) 40 MG capsule, Take 1 capsule (40 mg total) by mouth once daily. (Patient taking differently: Take 40 mg by mouth once daily. Takes at 12 noon), Disp: 30 capsule, Rfl: 3    tacrolimus (PROGRAF) 1 MG Cap, Take 2 mg in am and 1 mg in pm, Disp: 120 capsule, Rfl: 11    venlafaxine (EFFEXOR-XR) 37.5 MG 24 hr capsule, Take 37.5 mg by mouth once daily. , Disp: , Rfl:     Note: Only on Effexor    Psychiatric Hx:  · Childhood: None  · Adult: Yes  · 2017: Placed on anti-depressant due to prolonged grief/loss after her   in 2.5 years. Cries less only on the anti-depressant. Otherwise, no major improvements. Patient reports some mild improvement in mood.     Social History     Social History Main Topics    Smoking status: Never Smoker    Smokeless tobacco: Never Used    Alcohol use No    Drug use: No    Sexual activity: No     MENTAL STATUS AND OBSERVATIONS:  APPEARANCE: Casually dressed and adequate grooming/hygiene.   ALERTNESS/ORIENTATION: Attentive and alert. Fully oriented (x5) to time and place  GAIT: Very slow and aided by a Rolator   MOTOR MOVEMENTS/MANNERISMS: Minimal use of her left hand (Review 18 Neurology Exam)  SPEECH/LANGUAGE: Very slow rate of speech. Volume lower than normal. Some word finding difficulty noted, but no expressive aphasia. Receptive language was normal for basic instructions/commands. More complex comprehension was impaired, but this may also be due to slow processing speed.  STATED MOOD/AFFECT: The patients stated mood was "okay now." Affect was restricted, " but she would smile appropriately. No Pseudobulbar Affect noted during exam, but this was noted on Neurology exam later in the day.  INTERPERSONAL BEHAVIOR: Rapport was quickly and easily established   SUICIDALITY/HOMICIDALITY: Denied  HALLUCINATIONS/DELUSIONS: None evidenced or endorsed  THOUGHT PROCESSES: Minimal insight into cognitive difficulties, but no unusual thought processes.  TESTING TAKING BEHAVIOR AND VALIDITY: Embedded validity measures and observation of effort revealed adequate effort/engagement.     PROCEDURES/TESTS ADMINISTERED:  In addition to performing a review of pertinent medical records, reviewing limits to confidentiality, conducting a clinical interview, and explaining procedures, the following measures were administered: Mini-Mental State Examination (MMSE; Rosy et al., 1993 norms); Frontal Assessment Battery (ROSA); WRAT-4; Wechsler Adult Intelligence Scale-Fourth Edition (WAIS-IV Digit Span, Information and Similarities), Trail Making Test, parts A and B (Orion et al., 2004 norms); Emerson Naming Test (BNT-60; Orion et al., 2004 norms) Category and Letter-cued verbal fluency (animal naming/FAS; Orion et al., 2004 norms); Harding Verbal Learning Test - Revised (Form-1); WMS-IV Logical Memory (Older Adult Battery); Brief Visuospatial Memory Test, Revised (BVMT-R: Form 1);  Clock Drawing; Geriatric Depression Scale (GDS-30); LOUIS-7. Manual norms were used unless otherwise indicated.      TEST RESULTS  Percentile Interpretation:        </=3rd......................................Abnormal        4th-9th.....................................Borderline Abnormal        10th-24th...................................Low Average        25th-74th...................................Average        75th-90th...................................High Average        91st-97th...................................Superior        >/=97th.....................................Very Superior        SCREENING OF GENERAL  COGNITIVE FUNCTIONING:    MMSE (total score/%ile):     Total Score (max = 30)...............28 / WNL  Orientation to time..................5 / 5  Orientation to place.................5 / 5    ROSA (total score/descriptor):........12 / Impaired       ESTIMATED FSIQ and READING LEVEL:      WRAT-4 (Raw/SS/%ile)..................45 / 82 / 12th  WAIS-IV Information (SS/%ile)........6 / 9th      AUDITORY ATTENTION AND WORKING MEMORY    [id number] Span        Forward raw..........................8 / 25th      Forward span.........................6 /       Backward raw.........................6 / 25th      Backward span........................3 /       Sequencing raw.......................2 / 1st      Sequencing span......................2 /       Overall (SS/percentile)..............5 / 5th      MOTOR AND ORAL PROCESSING SPEED     Trail Making Test (sec. to completion/percentile):        Part A .....................................94 / <1st          Errors..................................1 /       LANGUAGE FUNCTIONING    WORD PRODUCTIVITY    Verbal Fluency Tests (raw/percentiles):        FAS.........................................11 / <1st      Animals.....................................6 / <1st      CONFRONTATION NAMING    [place] Naming Test (raw/percentile)        Total Spontaneous (max. = 60)...............36/ 5th      COMPREHENSION - MARTIN Token Test (max. = 44)      BDAE Complex Ideation        Total score (max=6)........................6 / WNL    VERBAL REASONING    WAIS-IV (scaled score/percentile):        Similarities................................3 / 1st    CONSTRUCTIONAL PRAXIS     Clock Drawing:        Request (max. = 5).........................3 / Impaired      Copy (max. = 5)..............................3 / Impaired    BVMT-R-Copy (max. = 12)..........................4 / Impaired      NONVERBAL LEARNING AND MEMORY    Brief Visuospatial Memory Test - Revised (raw score/percentile):        Form: 1         Trial 1......................................2 /       Trial 2......................................3 /       Trial 3......................................0 /       Total Recall.................................5 / <1st      Delayed Recall...............................3 / <1st      Recognition:            Hits.....................................6 /           False-Positives..........................1 /           Discrimination Index.....................5 /       VERBAL LEARNING AND MEMORY OF PROSE PASSAGES    WMS-IV (raw score/percentile):        Logical Memory I.............................22 / 63rd      Logical Memory II............................10 / 9th      Recognition..................................16 / 10-16th      VERBAL LEARNING AND MEMORY OF A WORDLIST    Harding Verbal Learning Test - Revised (raw score/percentile):        Form: 1        Trial 1......................................6 /       Trial 2......................................9 /       Trial 3......................................10 /       Total Recall................................25 / 27th      Delayed Recall...............................7 / 18th      Recognition:            Hits.....................................12 /           False-Positives..........................0 /           Discrimination Index.....................12 / 82nd      EXECUTIVE FUNCTIONING          Trail Making Test (sec. to completion/%ile):        Part B ....................................DC / <1st          Errors..................................8 /     VERBAL REASONING    WAIS-IV (scaled score/percentile):        Similarities................................3 / 1st    SELF-REPORTED MOOD  LOUIS-7...........................................2 / No sig anxiety  GDS.............................................8 / Mild reported depression    OVERALL SUMMARY  Patient has multiple medical co-morbidities (review below) and her family has noticed ongoing problems with  "cognition and motor functioning (falls, asymmetric weakness). She was referred for a neuropsychological evaluation by Neurology to characterize her cognitive status, for differential diagnosis, and for treatment recommendations. The patient's baseline or pre-morbid intellectual functioning was estimated to be in the low average range based on educational/occupational history and performance on a word reading measure. Results should be interpreted in that context.    Global mental status is normal range (MMSE=28/30) and she is fully oriented. Simple attention span is normal. More complex working memory is variable and her very low score on one measure may be due to some trouble comprehending complexity of the sequencing task. Mental speed is severely slow both on observation and testing. Motor speed/dexterity was not assessed as she has minimal use/dexterity of her left hand. Basic expressive/receptive language is normal for simple commands/instructions. She has difficulty with complex aspects of communication (both understanding and fully expressing herself). Object naming is borderline impaired. Verbal fluency is impaired for both semantic/phonemic conditions.  Basic and complex visuoconstruction are quite impaired on multiple measures.     Learning and memory scores note important findings. Visual learning/memory are quite impaired likely due to visuospatial deficits, but recognition cues improve her retrieval. Verbal learning is normal range, but she is having some difficulty retrieving information from memory. Fortunately, recognition cues improve her retrieval or "memory" quite a bit.   Executive functions are global impaired across nearly all measures.     Neuropsychiatrically, her daughters noticed significant depression extending beyond normative grief process when her  . With treatment, her depression has not markedly improved. She also has pseudobulbar affect (PBA) with bouts of inappropriate " laughter.    Overall, Ms. Greenberg has a Major Neurocognitive Disorder (dementia). Her cognitive profile revealed severely slowed mental speed, significant executive dysfunction, retrieval-based memory difficulties along with word finding trouble and visuospatial dysfunction. Etiology can be consistent with the effects of Progressive Supranuclear Palsy or another Parkinson-spectrum disorder. Her cognitive profile did not raise concern for Alzheimer's disease at this time. Aside from addressing her movement concerns, she has ongoing depression not optimally treated and other neuropsychiatric sxs (PBA). Addressing mood may also lead to some improvement for cognition. The following recommendations are noted:     Referral Dx:  Memory Loss    Consult Dx:  Major Neurocognitive Disorder (Dementia), Unspecified  Adjustment Disorder with Depressed Mood    GENESIS Jimenez II, Ph.D., ABPP-CN  Board Certified Clinical Neuropsychologist  Co-Director, Cognitive Disorders and Brain Health Program  Department of Neurology and Neurosciences  [facility]    RECOMMENDATIONS/TREATMENT PLAN  Follow Up Recommendations:  · Neurology Follow-up: Continued Neurology follow-up as recommended by Ms. Dougherty neurologist to address PSP and PBA. Of note, I had her seen today by MRI and Neurology given her physical sxs with a new diagnosis of PSP.  · Sleep Medicine Follow-up: Consultation with Sleep Medicine has been ordered to address snoring.  · Mental Health Follow-up:   · Psychiatry/Medical Psychology: Consultation with psychiatry or a medical psychologist is suggested for a medication evaluation to treat depression.   · Medical Follow-up: Continued follow-up as recommended by Ms. Dougherty treatment providers. Specific areas of concern include: post-liver transplant monitoring  · Driving Evaluation: She is no longer driving and should continue to refrain from driving.  · Supervision/Monitoring: Daily care/check-ins are recommended for  Ms. Greenberg to assist in daily living tasks and increase safety. If possible, living with a family member would be optimal.   · Neuropsychology: Neuropsychological reevaluation is recommended in 12-months following implementation of recommendations.    Recommendations for Ms. Greenberg and Caregivers/Family:   · Dementia Recommendations: Will provide our lengthy handout detailing strategies to manage various aspects of dementia, communication, functional living needs, legal issues, and so forth.   · Practice good cognitive hygiene:  · Engage in regular exercise, which increases alertness and arousal and can improve attention and focus.  Consider lower impact exercises, such as yoga or light walking.  · Get a good nights sleep, as this can enhance alertness and cognition.  · Eat healthy foods and balanced meals. It is notable that research indicates certain nutrients may aid in brain function, such as B vitamins (especially B6, B12, and folic acid), antioxidants (such as vitamins C and E, and beta carotene), and Omega-3 fatty acids. Talk with your physician or nutritionist about whats right for you.   · Keep your brain active. Find activities to stay mentally active, such as reading, games (cards, checkers), puzzles (crosswords, Sudoku, jig saw), crafts (models, woodworking), gardening, or participating in activities in the community.  · Stay socially engaged. Continue staying active with your family and friends.

## 2018-03-13 NOTE — PATIENT INSTRUCTIONS
Your symptoms and problems are consistent with the diagnosis of PSP (Progressive Supranuclear Palsy).  This is one type of parkinsonism.  Please see the brochure attached or go to [email or web address] for more information.    The medication for Parkinson's disease, Carbidopa-Levodopa, can sometimes help.  Thus, I want you to try it.  If you take it for a week and cannot tell any difference (gait, balance, swallowing), simply discontinue it.

## 2018-03-13 NOTE — LETTER
March 16, 2018      Asia Betancourt MD  [address] [place] [address]           [facility]  [address]  [facility] [address]  Phone: [phone]  Fax: [phone]          Patient: Evelyn Greenberg   MR Number: [id number]   YOB: 1950   Date of Visit: 3/13/2018       Dear Dr. Umesh Jarrell:    Thank you for referring Evelyn Greenberg to me for evaluation. Attached you will find relevant portions of my assessment and plan of care.    If you have questions, please do not hesitate to call me. I look forward to following Evelyn Greenberg along with you.    Sincerely,    Su Pena MD    Enclosure  CC:  No Recipients    If you would like to receive this communication electronically, please contact [email or web address] or [phone] to request more information on [email or web address] access.    For providers and/or their staff who would like to refer a patient to [facility], please contact us through our one-stop-shop provider referral line, [facility], at [phone].    If you feel you have received this communication in error or would no longer like to receive these types of communications, please e-mail [email or web address]

## 2018-03-13 NOTE — PROGRESS NOTES
Evelyn QUIROGA Chief Complaints during this visit:  New Patient visit for  parkinsonism      Primary Care Physician  Asia Betancourt MD  [address] [facility] [address]      History of present illness:   67 y.o.  female seen in consultation at the request of  Dr. Jimenez for evaluation of parkinsonism.  She came to [place] for neuropsych testing (sent by Dr. Aguillon), but our neuropsychologist, Dr. Jimenez, was concerned about stroke, sent her directly to one of our stroke docs who ruled this out with MRI, but immediately appreciated her parkinsonism and asked me to see patient.    Briefly (as diagnosis was immediately clear upon entering room), symptoms started about 2 years ago with memory and cognition, then a year ago with falls.  She lives alone and has thus far, not agreed to live with a daughter or enter a home.  She also cries or laughs easily.    II.  Review of systems:  As in HPI,  otherwise, balance 10 systems reviewed and are negative.    III.  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     Alpha-1 antitrypsin heterozygote     Mitral regurgitation     PONV (postoperative nausea and vomiting)     Pulmonary hypertension 2/25/2013    Respiratory distress 4/10/2014    Varices, esophageal      Family History   Problem Relation Age of Onset    Cirrhosis Mother     COPD Father     Heart disease Father     Breast cancer Neg Hx     Ovarian cancer Neg Hx      Social History     Social History    Marital status:      Spouse name: Bon    Number of children: N/A    Years of education: N/A     Occupational  History     May 1997     Social History Main Topics    Smoking status: Never Smoker    Smokeless tobacco: Never Used    Alcohol use No    Drug use: No    Sexual activity: No     Other Topics Concern    Not on file     Social History Narrative    No narrative on file         Current Outpatient Prescriptions on File Prior to Visit   Medication Sig Dispense Refill    amLODIPine (NORVASC) 10 MG tablet Take 10 mg by mouth once daily.      ascorbic acid, vitamin C, (VITAMIN C) 500 MG tablet Take 500 mg by mouth once daily.      aspirin (ECOTRIN) 81 MG EC tablet Take 81 mg by mouth every morning.       cyanocobalamin, vitamin B-12, 1,500 mcg TbDL Take 1,500 mcg by mouth once daily. (Patient taking differently: Take 1,500 mcg by mouth every morning. )      ERGOCALCIFEROL, VITAMIN D2, (VITAMIN D ORAL) Take 1,000 Units by mouth every morning.       fish oil-omega-3 fatty acids 300-1,000 mg capsule Take 1 capsule by mouth once daily.      folic acid (FOLVITE) 1 MG tablet Take 1 tablet (1 mg total) by mouth once daily. (Patient taking differently: Take 1 mg by mouth every morning. ) 30 tablet 5    furosemide (LASIX) 20 MG tablet Take 1 tablet (20 mg total) by mouth once daily. 30 tablet 1    lisinopril 10 MG tablet Take 1 tablet (10 mg total) by mouth every evening. 30 tablet 11    magnesium oxide (MAG-OX) 400 mg tablet Take 1 tablet (400 mg total) by mouth once daily. (Patient taking differently: Take 400 mg by mouth every morning. ) 30 tablet 3    metoprolol tartrate (LOPRESSOR) 25 MG tablet Take 25 mg by mouth 2 (two) times daily.      multivitamin (THERAGRAN) per tablet Take 1 tablet by mouth every morning.       mycophenolate (CELLCEPT) 250 mg Cap Take 2 capsules (500 mg total) by mouth 2 (two) times daily. 120 capsule 11    omeprazole (PRILOSEC) 40 MG capsule Take 1 capsule (40 mg total) by mouth once daily. (Patient taking differently: Take 40 mg by mouth once daily. Takes at 12 noon) 30 capsule 3  "   tacrolimus (PROGRAF) 1 MG Cap Take 2 mg in am and 1 mg in pm 120 capsule 11    venlafaxine (EFFEXOR-XR) 37.5 MG 24 hr capsule Take 37.5 mg by mouth once daily.        No current facility-administered medications on file prior to visit.        Review of patient's allergies indicates:  No Known Allergies    IV. Physical Exam   BP  150/90     Pulse 80    Ht 5' 5" (1.651 m)    Wt 110.5 kg (243 lb 9.7 oz)    BMI 40.54 kg/m²    BSA 2.25 m²     General appearance: Well nourished, well developed, no acute distress.         Cardiovascular:  pedal pulses 2, no edema or cyanosis, heart regular rate and rhythym, no carotid bruits.         -------------------------------------------------------------  Facial Expression: 1: Slight: Minimal masked facies manifested only by decreased frequency of blinking.        Affect: full, inappropriately giggly       Orientation to time & place:  Oriented to time, place, person and situation       Attention & concentration:  Normal attention span and concentration       Memory:  Not tested  Language: Spontaneous, fluent; able to repeat and name objects        Fund of knowledge:  Aware of current events        Speech:  normal (not dysarthric)  -------------------------------------------------------  Cranial nerves: normal visual acuity, visual fields full, optic discs not visualized, pupils equal round and reactive, extraocular movements markedly apraxic (unable to look up),       facial sensation intact, face symmetrical, hearing intact to whisper, palate raises midline, shoulder shrug strength normal, tongue protrudes midline.        -------------------------------------------------------  Musculoskeletal  Muscle tone: all 4 extremities normal        Muscle Bulk: all 4 extremities normal        Muscle strength:  5/5 in all 4 extremities        No pronator drift  Sensation: Intact to light touch in all extremities        Deep tendon Reflexes: 1 bilateral biceps, triceps, patella and " ankles        --------------------------------------------------------------  Cerebellar and Coordination  Gait:  Wide-based, mildly apraxic, cautious        Finger-nose: no dysmetria       Rapid Alternating Movements (pronation/supination):  R normal; L normal  --------------------------------------------------------------  MOVEMENT DISORDERS FOCUSED EXAM  Abnormality of movement (bradykinesia, hyperkinesia) present? Yes, 2: Mild: Mild global slowness and poverty of spontaneous movements.     Tremor present?   No   Posture:  normal  Postural stability:  + Rhomberg    V.  Laboratory/ Radiological Data: (Personally reviewed images)         Lab Results   Component Value Date    TSH 3.985 04/06/2014     No results found for: [id number]  Lab Results   Component Value Date    ALT 12 09/17/2015    AST 25 09/17/2015    GGT 16 03/30/2015    ALKPHOS 81 09/17/2015    BILITOT 0.7 09/17/2015      Mild general atrophy, moderate midline cerebellar atrophy.  Midbrain is normal.          VI. Assessment and Plan            Problem List Items Addressed This Visit        1 - High    PSP (progressive supranuclear palsy)    Current Assessment & Plan     Symptoms, Clinical and neuropsychological exams diagnostic of PSP.  Her MRI mid-brain morphology is not the textbook hummingbird appearance, but this is not sensitive.   -> trial of levodopa    -> advised long-term planning   -> given CurePSP handout            2     Oropharyngeal dysphagia    Current Assessment & Plan     Needs swallowing eval, locally (with modified barium).   -> defer to primary care            3     Pseudobulbar affect    Current Assessment & Plan     Consider Nuedexta                     Follow-up in about 3 months (around 6/13/2018).         ___________________________________________________________    I appreciate the opportunity to participate in the care of this patient and will communicate my assessment and plan back to the referring physician via copy of this  note.

## 2018-03-16 ENCOUNTER — TELEPHONE (OUTPATIENT)
Dept: NEUROLOGY | Facility: CLINIC | Age: 68
End: 2018-03-16

## 2018-03-16 NOTE — ASSESSMENT & PLAN NOTE
Symptoms, Clinical and neuropsychological exams diagnostic of PSP.  Her MRI mid-brain morphology is not the textbook hummingbird appearance, but this is not sensitive.   -> trial of levodopa    -> advised long-term planning   -> given CurePSP handout

## 2018-03-16 NOTE — TELEPHONE ENCOUNTER
Left message and call back number to offer appt       - Message from GENESIS Jimenez II, PhD sent at 3/15/2018 10:28 AM CDT -----  Can you call and schedule a feedback to be schedule within 4-weeks with them. They are from out of town. My preference is to review with them in person.

## 2018-03-19 ENCOUNTER — TELEPHONE (OUTPATIENT)
Dept: NEUROLOGY | Facility: CLINIC | Age: 68
End: 2018-03-19

## 2018-03-23 ENCOUNTER — TELEPHONE (OUTPATIENT)
Dept: SLEEP MEDICINE | Facility: CLINIC | Age: 68
End: 2018-03-23

## 2018-04-12 DIAGNOSIS — Z94.4 S/P LIVER TRANSPLANT: Primary | ICD-10-CM

## 2018-04-12 RX ORDER — MYCOPHENOLATE MOFETIL 250 MG/1
500 CAPSULE ORAL 2 TIMES DAILY
Qty: 120 CAPSULE | Refills: 11 | Status: SHIPPED | OUTPATIENT
Start: 2018-04-12 | End: 2018-05-09 | Stop reason: SDUPTHER

## 2018-04-20 LAB
EXT ALBUMIN: 3.5
EXT ALKALINE PHOSPHATASE: 77
EXT ALT: 15
EXT AST: 16
EXT BASOPHIL%: 1
EXT BILIRUBIN TOTAL: 0.6
EXT BUN: 22
EXT CALCIUM: 9.3
EXT CHLORIDE: 103
EXT CO2: 29
EXT CREATININE: 1.09 MG/DL
EXT EOSINOPHIL%: 3
EXT GFR MDRD AF AMER: 53
EXT GLUCOSE: 158
EXT HEMATOCRIT: 37.5
EXT HEMOGLOBIN: 12.4
EXT LYMPH%: 18
EXT MONOCYTES%: 10
EXT PLATELETS: 271
EXT POTASSIUM: 4.2
EXT PROTEIN TOTAL: 7.2
EXT SEGS%: 69
EXT SODIUM: 140 MMOL/L
EXT TACROLIMUS LVL: 4.2
EXT WBC: 8.3

## 2018-04-23 ENCOUNTER — HOSPITAL ENCOUNTER (OUTPATIENT)
Dept: SLEEP MEDICINE | Facility: OTHER | Age: 68
Discharge: HOME | End: 2018-04-23
Attending: INTERNAL MEDICINE
Payer: MEDICARE

## 2018-04-23 DIAGNOSIS — R06.83 SNORING: ICD-10-CM

## 2018-04-23 DIAGNOSIS — G47.33 OBSTRUCTIVE SLEEP APNEA (ADULT) (PEDIATRIC): ICD-10-CM

## 2018-04-23 DIAGNOSIS — G47.8 SLEEP AROUSAL DISORDER: ICD-10-CM

## 2018-04-23 DIAGNOSIS — G47.51 CONFUSIONAL AROUSALS: ICD-10-CM

## 2018-04-23 PROCEDURE — 95810 POLYSOM 6/> YRS 4/> PARAM: CPT | Mod: 26,,, | Performed by: PSYCHIATRY & NEUROLOGY

## 2018-04-23 PROCEDURE — 95810 POLYSOM 6/> YRS 4/> PARAM: CPT

## 2018-04-24 ENCOUNTER — OFFICE VISIT (OUTPATIENT)
Dept: NEUROLOGY | Facility: CLINIC | Age: 68
End: 2018-04-24
Payer: MEDICARE

## 2018-04-24 DIAGNOSIS — G23.1 PSP (PROGRESSIVE SUPRANUCLEAR PALSY): ICD-10-CM

## 2018-04-24 DIAGNOSIS — F03.90 MAJOR NEUROCOGNITIVE DISORDER: Primary | ICD-10-CM

## 2018-04-24 PROCEDURE — 99499 UNLISTED E&M SERVICE: CPT | Mod: S$GLB,,, | Performed by: CLINICAL NEUROPSYCHOLOGIST

## 2018-04-24 NOTE — PROGRESS NOTES
This is a Screen study for 67 year old Evelyn Greenberg.  The procedure was explained to the patient upon arrival. This included the set-up process and what to expect during the night.   It was also explained to the patient that the test will be interpreted by a physician and the results will be sent to her PCP.  Her EKG appeared to be NSR.  Occational soft snoring? Pt could not sleep most of the test she turned from side to side scratching her head/face.   She stated that she does this at home and cannot sleep.   She also stated that she usually sleeps prone at home.  A thank you letter was given to the patient upon leaving the sleep lab.

## 2018-04-24 NOTE — PROGRESS NOTES
Neuropsychology Feedback Note    Date of Session: 04/24/2018  Session Content: Results and recommendations were discussed for 51-minutes with patient and daughters. Review Neuropsychology Consult dated for details. No further neuropsychology feedback needed. Also, discussed caregiving burden for daughter and associated recommendations. They will follow-up with Dr. Pena.

## 2018-04-26 ENCOUNTER — TELEPHONE (OUTPATIENT)
Dept: TRANSPLANT | Facility: CLINIC | Age: 68
End: 2018-04-26

## 2018-04-26 NOTE — TELEPHONE ENCOUNTER
----- Message from Estefani Olson MD sent at 4/22/2018  9:23 PM CDT -----  The Labs are stable - please let patient know.

## 2018-05-02 ENCOUNTER — TELEPHONE (OUTPATIENT)
Dept: TRANSPLANT | Facility: CLINIC | Age: 68
End: 2018-05-02

## 2018-05-02 NOTE — TELEPHONE ENCOUNTER
----- Message from Estefani Olson MD sent at 5/2/2018 12:14 PM CDT -----  Pt has sleep apnea- make sure she starts cpap as recommended

## 2018-05-02 NOTE — TELEPHONE ENCOUNTER
Pt daughter notified CPAP needed. Pt daughter reports pt will not wear the cpap makes her claustrophobic. Advised that was the recommentation from the sleep study and the can look into the different styles of cpap. Pt daughter still stands firm that she will not sleep with it on period.. Will let Dr. Olson know

## 2018-05-05 ENCOUNTER — PATIENT MESSAGE (OUTPATIENT)
Dept: TRANSPLANT | Facility: CLINIC | Age: 68
End: 2018-05-05

## 2018-05-09 DIAGNOSIS — Z94.4 S/P LIVER TRANSPLANT: ICD-10-CM

## 2018-05-09 RX ORDER — MYCOPHENOLATE MOFETIL 250 MG/1
500 CAPSULE ORAL 2 TIMES DAILY
Qty: 120 CAPSULE | Refills: 11 | Status: SHIPPED | OUTPATIENT
Start: 2018-05-09

## 2018-05-16 ENCOUNTER — OFFICE VISIT (OUTPATIENT)
Dept: NEUROLOGY | Facility: CLINIC | Age: 68
End: 2018-05-16
Payer: MEDICARE

## 2018-05-16 VITALS
DIASTOLIC BLOOD PRESSURE: 85 MMHG | HEIGHT: 65 IN | WEIGHT: 250.69 LBS | SYSTOLIC BLOOD PRESSURE: 161 MMHG | HEART RATE: 80 BPM | BODY MASS INDEX: 41.77 KG/M2

## 2018-05-16 DIAGNOSIS — Z94.4 LIVER REPLACED BY TRANSPLANT: Chronic | ICD-10-CM

## 2018-05-16 DIAGNOSIS — G25.81 RLS (RESTLESS LEGS SYNDROME): ICD-10-CM

## 2018-05-16 DIAGNOSIS — R13.12 OROPHARYNGEAL DYSPHAGIA: ICD-10-CM

## 2018-05-16 DIAGNOSIS — E88.01 ALPHA-1-ANTITRYPSIN DEFICIENCY: ICD-10-CM

## 2018-05-16 DIAGNOSIS — E66.01 MORBID OBESITY: ICD-10-CM

## 2018-05-16 DIAGNOSIS — G23.1 PSP (PROGRESSIVE SUPRANUCLEAR PALSY): Primary | ICD-10-CM

## 2018-05-16 DIAGNOSIS — F48.2 PSEUDOBULBAR AFFECT: ICD-10-CM

## 2018-05-16 PROCEDURE — 99999 PR PBB SHADOW E&M-EST. PATIENT-LVL III: CPT | Mod: PBBFAC,,, | Performed by: PSYCHIATRY & NEUROLOGY

## 2018-05-16 PROCEDURE — 99214 OFFICE O/P EST MOD 30 MIN: CPT | Mod: S$GLB,,, | Performed by: PSYCHIATRY & NEUROLOGY

## 2018-05-16 PROCEDURE — 3077F SYST BP >= 140 MM HG: CPT | Mod: CPTII,S$GLB,, | Performed by: PSYCHIATRY & NEUROLOGY

## 2018-05-16 PROCEDURE — 3079F DIAST BP 80-89 MM HG: CPT | Mod: CPTII,S$GLB,, | Performed by: PSYCHIATRY & NEUROLOGY

## 2018-05-16 NOTE — ASSESSMENT & PLAN NOTE
Declines feeding tube, but she's also not losing weight.  As peg would not reduce her risk of aspiration, it's only purpose would be nutrients.

## 2018-05-16 NOTE — ASSESSMENT & PLAN NOTE
Falling twice a week, choking often.  Declines feeding tube.   -> advised supervision, caution as nothing else to be done about falling until wheelchair-bound   -> continue sinemet as patient feels some benefit   -> will have research coordinator call her about current PSP trial

## 2018-05-16 NOTE — PROGRESS NOTES
Evelyn QUIROGA Chief Complaints during this visit:  f/u Patient visit for  PSP     Primary Care Physician  Asia Betancourt MD  [address] [facility] [address]      History of present illness:   67 y.o.  W seen in f/u for PSP.  Accompanied by daughters.  She no longer cries easily, but laughs easily.  They are interested in trying the neudexta now.    She says she feels sinemet has helped her, but daughters don't appreciate.  She falls about 2x/week.    Needs shoulder surgery, but family fearful about confusion after surgery.    Choking frequently, but not losing weight.      From initial consult 3/13/18:  consultation at the request of  Dr. Jimenez for evaluation of parkinsonism.  She came to [place] for neuropsych testing (sent by Dr. Aguillon), but our neuropsychologist, Dr. Jimenze, was concerned about stroke, sent her directly to one of our stroke docs who ruled this out with MRI, but immediately appreciated her parkinsonism and asked me to see patient.    Briefly (as diagnosis was immediately clear upon entering room), symptoms started about 2 years ago with memory and cognition, then a year ago with falls.  She lives alone and has thus far, not agreed to live with a daughter or enter a home.  She also cries or laughs easily.    II.  Review of systems:  As in HPI,  otherwise, balance 3 systems reviewed and are negative.    III.  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     Alpha-1 antitrypsin heterozygote     Mitral regurgitation     Obstructive sleep apnea (adult) (pediatric)     PONV (postoperative nausea and  vomiting)     Pulmonary hypertension 2/25/2013    Respiratory distress 4/10/2014    Varices, esophageal      Family History   Problem Relation Age of Onset    Cirrhosis Mother     COPD Father     Heart disease Father     Breast cancer Neg Hx     Ovarian cancer Neg Hx      Social History     Social History    Marital status:      Spouse name: Bon    Number of children: N/A    Years of education: N/A     Occupational History     May 1997     Social History Main Topics    Smoking status: Never Smoker    Smokeless tobacco: Never Used    Alcohol use No    Drug use: No    Sexual activity: No     Other Topics Concern    None     Social History Narrative    None         Current Outpatient Prescriptions on File Prior to Visit   Medication Sig Dispense Refill    amLODIPine (NORVASC) 10 MG tablet Take 10 mg by mouth once daily.      ascorbic acid, vitamin C, (VITAMIN C) 500 MG tablet Take 500 mg by mouth once daily.      aspirin (ECOTRIN) 81 MG EC tablet Take 81 mg by mouth every morning.       carbidopa-levodopa  mg (SINEMET)  mg per tablet Take 1 tablet by mouth 3 (three) times daily. 270 tablet 3    cyanocobalamin, vitamin B-12, 1,500 mcg TbDL Take 1,500 mcg by mouth once daily. (Patient taking differently: Take 1,500 mcg by mouth every morning. )      ERGOCALCIFEROL, VITAMIN D2, (VITAMIN D ORAL) Take 1,000 Units by mouth every morning.       fish oil-omega-3 fatty acids 300-1,000 mg capsule Take 1 capsule by mouth once daily.      folic acid (FOLVITE) 1 MG tablet Take 1 tablet (1 mg total) by mouth once daily. (Patient taking differently: Take 1 mg by mouth every morning. ) 30 tablet 5    furosemide (LASIX) 20 MG tablet Take 1 tablet (20 mg total) by mouth once daily. 30 tablet 1    lisinopril 10 MG tablet Take 1 tablet (10 mg total) by mouth every evening. 30 tablet 11    magnesium oxide (MAG-OX) 400 mg tablet Take 1 tablet (400 mg total) by mouth once daily. (Patient  "taking differently: Take 400 mg by mouth every morning. ) 30 tablet 3    metoprolol tartrate (LOPRESSOR) 25 MG tablet Take 25 mg by mouth 2 (two) times daily.      multivitamin (THERAGRAN) per tablet Take 1 tablet by mouth every morning.       mycophenolate (CELLCEPT) 250 mg Cap Take 2 capsules (500 mg total) by mouth 2 (two) times daily. 120 capsule 11    omeprazole (PRILOSEC) 40 MG capsule Take 1 capsule (40 mg total) by mouth once daily. (Patient taking differently: Take 40 mg by mouth once daily. Takes at 12 noon) 30 capsule 3    tacrolimus (PROGRAF) 1 MG Cap Take 2 mg in am and 1 mg in pm 120 capsule 11    venlafaxine (EFFEXOR-XR) 37.5 MG 24 hr capsule Take 37.5 mg by mouth once daily.        No current facility-administered medications on file prior to visit.      Past medications tried:  requip (for RLS) caused crying    Review of patient's allergies indicates:  No Known Allergies    IV. Physical Exam   BP  150/90     Pulse 80    Ht 5' 5" (1.651 m)    Wt 110.5 kg (243 lb 9.7 oz)    BMI 40.54 kg/m²    BSA 2.25 m²     General appearance: Well nourished, well developed, no acute distress.         Cardiovascular:  pedal pulses 2, no edema or cyanosis, heart regular rate and rhythym, no carotid bruits.         -------------------------------------------------------------  Facial Expression: 1: Slight: Minimal masked facies manifested only by decreased frequency of blinking.        Affect: full, inappropriately giggly       Orientation to time & place:  Oriented to time, place, person and situation       Attention & concentration:  Normal attention span and concentration       Memory:  Not tested  Language: Spontaneous, fluent; able to repeat and name objects        Fund of knowledge:  Aware of current events        Speech:  normal (not dysarthric)  -------------------------------------------------------  Cranial nerves: normal visual acuity, visual fields full, optic discs not visualized, pupils equal round " and reactive, extraocular movements markedly apraxic (unable to look up),       facial sensation intact, face symmetrical, hearing intact to whisper, palate raises midline, shoulder shrug strength normal, tongue protrudes midline.        -------------------------------------------------------  Musculoskeletal  Muscle tone: all 4 extremities normal        Muscle Bulk: all 4 extremities normal        Muscle strength:  5/5 in all 4 extremities        No pronator drift  Sensation: Intact to light touch in all extremities        Deep tendon Reflexes: 1 bilateral biceps, triceps, patella and ankles        --------------------------------------------------------------  Cerebellar and Coordination  Gait:  Wide-based, mildly apraxic, cautious        Finger-nose: no dysmetria       Rapid Alternating Movements (pronation/supination):  R normal; L normal  --------------------------------------------------------------  MOVEMENT DISORDERS FOCUSED EXAM  Abnormality of movement (bradykinesia, hyperkinesia) present? Yes, 2: Mild: Mild global slowness and poverty of spontaneous movements.     Tremor present?   No   Posture:  normal  Postural stability:  + Rhomberg    V.  Laboratory/ Radiological Data: (Personally reviewed images)         Lab Results   Component Value Date    TSH 3.985 04/06/2014     No results found for: [id number]  Lab Results   Component Value Date    ALT 12 09/17/2015    AST 25 09/17/2015    GGT 16 03/30/2015    ALKPHOS 81 09/17/2015    BILITOT 0.7 09/17/2015      Mild general atrophy, moderate midline cerebellar atrophy.  Midbrain is normal.          VI. Assessment and Plan            Problem List Items Addressed This Visit        1 - High    PSP (progressive supranuclear palsy) - Primary    Current Assessment & Plan     Falling twice a week, choking often.  Declines feeding tube.   -> advised supervision, caution as nothing else to be done about falling until wheelchair-bound   -> continue sinemet as patient  feels some benefit   -> will have research coordinator call her about current PSP trial            2     RLS (restless legs syndrome)    Current Assessment & Plan     Weekly worsening, but over-all controlled with sinemet.         Oropharyngeal dysphagia    Current Assessment & Plan     Declines feeding tube, but she's also not losing weight.  As peg would not reduce her risk of aspiration, it's only purpose would be nutrients.            3     Pseudobulbar affect    Current Assessment & Plan     Inappropriate laughter.   -> start neudexta         Relevant Medications    dextromethorphan-quinidine 20-10 mg (NUEDEXTA) 20-10 mg per capsule       4     Alpha-1-antitrypsin deficiency    Overview     Based on stain of explant liver         Liver transplant 3/25/2014 for Alpha 1 (Chronic)       5     Morbid obesity                Follow-up in about 4 months (around 9/16/2018) for PSP.

## 2018-05-24 NOTE — TELEPHONE ENCOUNTER
----- Message from Sherry Boyle sent at 5/24/2018  9:13 AM CDT -----  Contact: Khadra Starr (daughter) @ [phone]  Pt will need a prior auth for her prescription of dextromethorphan-quinidine 20-10 mg (NUEDEXTA) 20-10 mg per capsule.

## 2018-05-29 NOTE — TELEPHONE ENCOUNTER
----- Message from Dillan Merritt sent at 5/29/2018 10:20 AM CDT -----  Contact: Dillan ( daughter ) @ [phone]  Caller is requesting a return call about the authorization for the  medication ( dextromethorphan-quinidine 20-10 mg (NUEDEXTA) 20-10 mg per capsule )

## 2018-05-29 NOTE — TELEPHONE ENCOUNTER
Called and spoke to Dillan regarding her mother's  Medication. I stated that it was approved and she can pick it up from the pharmacy

## 2018-07-02 NOTE — PROGRESS NOTES
Transplant Hepatology  Liver Transplant Recipient Follow-up    Transplant Date: 3/25/2014  UNOS Native Liver Dx: Metabolic Disease (METDIS): Alpha-1-Antitrypsin Deficiency (A-1-A)    Evelyn is here for follow up of her liver transplant.    ORGAN: LIVER  Whole or Partial: whole liver  Donor Type:  - brain death  CDC High Risk: no  Donor CMV Status: negative  Donor HCV Status: negative  Donor HBcAb: negative  Biliary Anastomosis: end to end  Arterial Anatomy: standard  IVC reconstruction: end to end ivc  Portal vein status: completely thrombosed    She has had the following complications since transplant: incisional hernia. This was repaired with mesh in 09/15 and has now recurred.     Subjective:     Interval History: Evelyn is now 4 years post liver transplant.     Since I last saw her she saw Dr Pena, neurology who has diagnosed the patient with Progressive Supranucear Palsy. She has since started Sinemet and more recently Nuedexta. She seems to be falling less but is overall weaker. She could not get up onto the exam table today and needs a wheelchair for ambulation of distances. She is having trouble swallowing and coughs often. A G-tube was recommended and she has refused. She had been advised to not proceed with rotator cuff surgery.    She continues to have excellent allograft function (ALT 15, AST 16). Most recent creatinine is 1.09. Prograf level is running 3-5. She remains on prograf and cellcept.    Review of Systems   Constitutional: Negative.    HENT: Negative.    Eyes: Negative.    Respiratory: Negative.    Cardiovascular: Negative.    Gastrointestinal: Negative.    Genitourinary: Negative.    Musculoskeletal: Negative.    Skin: Negative.    Neurological: Negative.    Psychiatric/Behavioral: Negative.        Objective:     Physical Exam   Constitutional: She is oriented to person, place, and time. She appears well-developed and well-nourished.   HENT:   Head: Normocephalic and atraumatic.    Eyes: Conjunctivae and EOM are normal. Pupils are equal, round, and reactive to light. No scleral icterus.   Neck: Normal range of motion. Neck supple. No thyromegaly present.   Cardiovascular: Normal rate, regular rhythm and normal heart sounds.    Pulmonary/Chest: Effort normal and breath sounds normal. She has no rales.   Abdominal: Soft. Bowel sounds are normal. She exhibits no distension and no mass. There is no tenderness. A hernia (incisioncal) is present.   Musculoskeletal: Normal range of motion. She exhibits no edema.   Neurological: She is alert and oriented to person, place, and time.   Skin: Skin is warm and dry. No rash noted.   Psychiatric: She has a normal mood and affect.   Vitals reviewed.    Lab Results   Component Value Date    BILITOT 0.7 09/17/2015    AST 25 09/17/2015    ALT 12 09/17/2015    ALKPHOS 81 09/17/2015    CREATININE 1.2 09/17/2015    ALBUMIN 3.0 (L) 09/17/2015     Lab Results   Component Value Date    WBC 10.26 09/17/2015    HGB 10.2 (L) 09/17/2015    HCT 30.9 (L) 09/17/2015    HCT 24 (L) 08/08/2014     09/17/2015     Lab Results   Component Value Date    TACROLIMUS 3.5 (L) 09/17/2015       Assessment/Plan:     The patient is now 4 years post liver transplant. She has been diagnosed with Progressive Supranucear Palsy (PSP). Current recommendations:  1. Complication of liver tx: incisional hernia: continue conservative management  2. PSP: this is a progressive disease. She will eventually not be able to swallow pills. I encouraged her to reconsider a PEG. I will have her transplant coordinator speak with transplant pharmacy. I believe there is a SL prograf that she could be switched to. However, there is no SL cellcept and I am not sure if either drugs can be crushed through a G-tube. If the cellcept can not be crushed, I will likely stop it altogether and just continue SL prograf monotherapy.  3. HTN, ongoing: continue antihypertensives  5. CRI, ongoing: stable on lower  dose prograf- monitor  5. S/p liver transplant and control of IS: continue low dose prograf, target 3-4; continue cellcept 500 mg bid. May need to stop cellcept as above    Return 3 months    Estefani Olson MD           [facility] Patient Status  Functional Status: 90% - Able to carry on normal activity: minor symptoms of disease  Physical Capacity: No Limitations    . . .

## 2018-07-02 NOTE — LETTER
July 3, 2018        Pako Diez  [address] [address]  Phone: [phone]     Asia Betancourt  [address] [facility] [address]  Phone: [phone]  Fax: [phone]             [facility] Liver Transplant  [address] [address]  Phone: [phone]  Fax: [phone]   Patient: Evelyn Greenberg   MR Number: [id number]   YOB: 1950   Date of Visit: 7/2/2018       Dear Dr. Pako Diez, Asia Betancourt    Thank you for referring Evelyn Greenberg to me for evaluation. Attached you will find relevant portions of my assessment and plan of care.    If you have questions, please do not hesitate to call me. I look forward to following Evelyn Greenberg along with you.    Sincerely,    Estefani Olson MD    Enclosure    If you would like to receive this communication electronically, please contact [email or web address] or [phone] to request [employer] access.    [employer] is a tool which provides read-only access to select patient information with whom you have a relationship. Its easy to use and provides real time access to review your patients record including encounter summaries, notes, results, and demographic information.    If you feel you have received this communication in error or would no longer like to receive these types of communications, please e-mail [email or web address]

## 2018-07-02 NOTE — PATIENT INSTRUCTIONS
1. Will call [facility] to have you do labs now and every 3 months  2. I will check with Dr Pena/transplant pharmacists re which allergy medicine will be ok  Dr Olson [phone]

## 2018-07-02 NOTE — Clinical Note
willy this is the pt I spoke to you about yesterday- has PSP and will soon not be able to swallow her pills. She is reconsidering a PEG tube. Please check with pharmacy- I believe there is SL prograf but not cellcept and I do not think you can crush cellcept. My plan would be to stop it altogether.

## 2018-07-03 NOTE — TELEPHONE ENCOUNTER
----- Message from Estefani Olson MD sent at 7/3/2018  9:58 AM CDT -----  willy joyner is the pt I spoke to you about yesterday- has PSP and will soon not be able to swallow her pills. She is reconsidering a PEG tube. Please check with pharmacy- I believe there is SL prograf but not cellcept and I do not think you can crush cellcept. My plan would be to stop it altogether.

## 2018-07-03 NOTE — TELEPHONE ENCOUNTER
----- Message from Bairon Rogel PharmD sent at 7/3/2018  1:06 PM CDT -----  Willy,    Tacrolimus can be administered SL at 1/2 of the patient's current dose. Both Tacro and mycophenolate are available in liquid formulations, so if patient gets a PEG tube, those would both be options.    Thanks,  Bairon Rogel, PharmD. BCPS  ----- Message -----  From: Willy Hammond RN  Sent: 7/3/2018  11:48 AM  To: Abdominal Transplant Pharmacists      ----- Message -----  From: Estefani Olson MD  Sent: 7/3/2018   9:58 AM  To: [facility] Post-Liver Transplant Clinical    willy this is the pt I spoke to you about yesterday- has PSP and will soon not be able to swallow her pills. She is reconsidering a PEG tube. Please check with pharmacy- I believe there is SL prograf but not cellcept and I do not think you can crush cellcept. My plan would be to stop it altogether.

## 2018-07-05 NOTE — TELEPHONE ENCOUNTER
Pt daughter Dillan has been advised to contact the office if swallowing gets worse . Pt was recently diagnosed with PSP(progressive supranuclear palsy) will transition to  prograf per Dr. Olson when needed. Pt is to f/u with neurology 8/31/18

## 2018-07-26 NOTE — TELEPHONE ENCOUNTER
----- Message from Estefani Olson MD sent at 7/25/2018 12:25 PM CDT -----  The Labs are stable - please let patient know.

## 2018-08-31 NOTE — PROGRESS NOTES
Evelyn QUIROGA Chief Complaints during this visit:  f/u Patient visit for  PSP     Primary Care Physician  Asia Betancourt MD  [address] [address]      History of present illness:   67 y.o.  W seen in f/u for PSP.  Accompanied by daughters.  Falling more and even broke 2 toes. This only occurs when getting out of bed to use bathroom without calling for help.  They have baby monitors, but also now a motion sensor to catch her when she gets up.  Has 3 sitters for around the clock.    Unable to fit a wheelchair in her trailer home.  Travel to  today was extremely difficult for all.     Main concern is hallucinations and delusions that her  is around.    Incontinence worsening.    Choking continues, but no heimlich needed.    neudexta has not helped with the pba.    Rare restless legs.      Interval history 5/16/18:  Accompanied by daughters.  She no longer cries easily, but laughs easily.  They are interested in trying the neudexta now.  She says she feels sinemet has helped her, but daughters don't appreciate.  She falls about 2x/week.  Needs shoulder surgery, but family fearful about confusion after surgery.  Choking frequently, but not losing weight.      From initial consult 3/13/18:  consultation at the request of  Dr. Jimenez for evaluation of parkinsonism.  She came to [place] for neuropsych testing (sent by Dr. Aguillon), but our neuropsychologist, Dr. Jimenez, was concerned about stroke, sent her directly to one of our stroke docs who ruled this out with MRI, but immediately appreciated her parkinsonism and asked me to see patient.    Briefly (as diagnosis was immediately clear upon entering room), symptoms started about 2 years ago with memory and cognition, then a year ago with falls.  She lives alone and has thus far, not agreed to live with a daughter or enter a home.  She also cries or laughs easily.    II.  Review of systems:  As in HPI,  otherwise, balance 3 systems reviewed  and are negative.    III.  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     Alpha-1 antitrypsin heterozygote     Mitral regurgitation     Obstructive sleep apnea (adult) (pediatric)     PONV (postoperative nausea and vomiting)     Pulmonary hypertension 2/25/2013    Respiratory distress 4/10/2014    Varices, esophageal      Family History   Problem Relation Age of Onset    Cirrhosis Mother     COPD Father     Heart disease Father     Breast cancer Neg Hx     Ovarian cancer Neg Hx      Social History     Socioeconomic History    Marital status:      Spouse name: Bon    Number of children: None    Years of education: None    Highest education level: None   Social Needs    Financial resource strain: None    Food insecurity - worry: None    Food insecurity - inability: None    Transportation needs - medical: None    Transportation needs - non-medical: None   Occupational History     Employer: [employer] 1997   Tobacco Use    Smoking status: Never Smoker    Smokeless tobacco: Never Used   Substance and Sexual Activity    Alcohol use: No    Drug use: No    Sexual activity: No   Other Topics Concern    None   Social History Narrative    None         Current Outpatient Medications on File Prior to Visit   Medication Sig Dispense Refill    amLODIPine (NORVASC) 10 MG tablet Take 10 mg by mouth once daily.      ascorbic acid, vitamin C, (VITAMIN C) 500 MG tablet Take 500 mg by mouth once daily.      aspirin (ECOTRIN) 81 MG EC tablet Take 81 mg by mouth every morning.       carbidopa-levodopa  mg (SINEMET)   mg per tablet Take 1 tablet by mouth 3 (three) times daily. 270 tablet 3    cyanocobalamin, vitamin B-12, 1,500 mcg TbDL Take 1,500 mcg by mouth once daily. (Patient taking differently: Take 1,500 mcg by mouth every morning. )      dextromethorphan-quinidine 20-10 mg (NUEDEXTA) 20-10 mg per capsule Take 1 capsule by mouth once daily. 90 capsule 4    ERGOCALCIFEROL, VITAMIN D2, (VITAMIN D ORAL) Take 1,000 Units by mouth every morning.       fish oil-omega-3 fatty acids 300-1,000 mg capsule Take 1 capsule by mouth once daily.      folic acid (FOLVITE) 1 MG tablet Take 1 tablet (1 mg total) by mouth once daily. (Patient taking differently: Take 1 mg by mouth every morning. ) 30 tablet 5    furosemide (LASIX) 20 MG tablet Take 1 tablet (20 mg total) by mouth once daily. 30 tablet 1    lisinopril 10 MG tablet Take 1 tablet (10 mg total) by mouth every evening. 30 tablet 11    magnesium oxide (MAG-OX) 400 mg tablet Take 1 tablet (400 mg total) by mouth once daily. (Patient taking differently: Take 400 mg by mouth every morning. ) 30 tablet 3    metoprolol tartrate (LOPRESSOR) 25 MG tablet Take 25 mg by mouth 2 (two) times daily.      multivitamin (THERAGRAN) per tablet Take 1 tablet by mouth every morning.       mycophenolate (CELLCEPT) 250 mg Cap Take 2 capsules (500 mg total) by mouth 2 (two) times daily. 120 capsule 11    omeprazole (PRILOSEC) 40 MG capsule Take 1 capsule (40 mg total) by mouth once daily. (Patient taking differently: Take 40 mg by mouth once daily. Takes at 12 noon) 30 capsule 3    tacrolimus (PROGRAF) 1 MG Cap Take 2 mg in am and 1 mg in pm 120 capsule 11    venlafaxine (EFFEXOR-XR) 37.5 MG 24 hr capsule Take 37.5 mg by mouth once daily.        No current facility-administered medications on file prior to visit.      Past medications tried:  requip (for RLS) caused crying    Review of patient's allergies indicates:  No Known Allergies    IV. Physical Exam   BP  150/90     Pulse  "80     5' 5" (1.651 m)    Wt 110.5 kg (243 lb 9.7 oz)    BMI 40.54 kg/m²    BSA 2.25 m²     General appearance: Well nourished, well developed, no acute distress.         Cardiovascular:  pedal pulses 2, no edema or cyanosis, heart regular rate and rhythym, no carotid bruits.         -------------------------------------------------------------  Facial Expression: 1: Slight: Minimal masked facies manifested only by decreased frequency of blinking.        Affect: full, inappropriately giggly       Orientation to time & place:  Oriented to time, place, person and situation       Attention & concentration:  Normal attention span and concentration       Memory:  Not tested  Language: Spontaneous, fluent; able to repeat and name objects        Fund of knowledge:  Aware of current events        Speech:  normal (not dysarthric)  -------------------------------------------------------  Cranial nerves: normal visual acuity, visual fields full, optic discs not visualized, pupils equal round and reactive, extraocular movements markedly apraxic (unable to look up),       facial sensation intact, face symmetrical, hearing intact to whisper, palate raises midline, shoulder shrug strength normal, tongue protrudes midline.        -------------------------------------------------------  Musculoskeletal  Muscle tone: all 4 extremities normal        Muscle Bulk: all 4 extremities normal        Muscle strength:  5/5 in all 4 extremities        No pronator drift  Sensation: Intact to light touch in all extremities        Deep tendon Reflexes: 1 bilateral biceps, triceps, patella and ankles        --------------------------------------------------------------  Cerebellar and Coordination  Gait:  Wide-based, mildly apraxic, cautious        Finger-nose: no dysmetria       Rapid Alternating Movements (pronation/supination):  R normal; L normal  --------------------------------------------------------------  MOVEMENT DISORDERS FOCUSED " EXAM  Abnormality of movement (bradykinesia, hyperkinesia) present? Yes, 2: Mild: Mild global slowness and poverty of spontaneous movements.     Tremor present?   No   Posture:  normal  Postural stability:  + Rhomberg    V.  Laboratory/ Radiological Data: (Personally reviewed images)         Lab Results   Component Value Date    TSH 3.985 04/06/2014     No results found for: [id number]  Lab Results   Component Value Date    ALT 12 09/17/2015    AST 25 09/17/2015    GGT 16 03/30/2015    ALKPHOS 81 09/17/2015    BILITOT 0.7 09/17/2015      Mild general atrophy, moderate midline cerebellar atrophy.  Midbrain is normal.          VI. Assessment and Plan            Problem List Items Addressed This Visit        1 - High    PSP (progressive supranuclear palsy) - Primary    Current Assessment & Plan     Worsening symptoms as expected for diagnosis, falling, choking and minor hallucinations.   -> advised transport chair (wheelchair won't fit through her doorways)   -> HH   -> continue sinemet, but advised that she can always experiment with lower doses as the benefits are entirely subjective.         Relevant Orders    Ambulatory Referral to Home Health       2     RLS (restless legs syndrome)    Current Assessment & Plan     rare         Oropharyngeal dysphagia    Current Assessment & Plan     Continues to decline peg tube (appropriately).   -> advised serving her smaller portions            3     Pseudobulbar affect    Current Assessment & Plan     Not much effect from nuedexta.   -> may discontinue                     Follow-up in about 4 months (around 12/31/2018) for PSP for virtual visit.

## 2018-08-31 NOTE — LETTER
August 31, 2018      Asia Betancourt MD  [address] [facility] [address]           [facility]  [address]  [place] [address]  Phone: [phone]  Fax: [phone]          Patient: Evelyn Greenberg   MR Number: [id number]   YOB: 1950   Date of Visit: 8/31/2018       Dear Dr. Asia Betancourt:    Thank you for referring Evelyn Greenberg to me for evaluation. Attached you will find relevant portions of my assessment and plan of care.    If you have questions, please do not hesitate to call me. I look forward to following Evelyn Greenberg along with you.    Sincerely,    Su Pena MD    Enclosure  CC:  No Recipients    If you would like to receive this communication electronically, please contact [email or web address] or [phone] to request more information on [employer] access.    For providers and/or their staff who would like to refer a patient to [facility], please contact us through our one-stop-shop provider referral line, [facility], at [phone].    If you feel you have received this communication in error or would no longer like to receive these types of communications, please e-mail [email or web address]

## 2018-09-06 NOTE — TELEPHONE ENCOUNTER
----- Message from Alexia Hassan sent at 9/6/2018 10:12 AM CDT -----  Contact: patient daughter Khadra   Patient daughter Khadra has a medical question about her mother and would like a call this morning or some time today if possible.           Please call [phone]      Thanks!

## 2018-10-08 NOTE — TELEPHONE ENCOUNTER
----- Message from Estefani Olson MD sent at 10/6/2018  1:31 PM CDT -----  The Labs are stable - please let patient know.

## 2018-12-11 NOTE — TELEPHONE ENCOUNTER
----- Message from Ya Moffett sent at 12/11/2018  1:37 PM CST -----  Contact: Patient  Rx Refill/Request     Is this a Refill or New Rx:  New rx   Rx Name and Strength:  prograf  Preferred Pharmacy with phone number: [facility] [phone]  Communication Preference: Khadra [phone]  Additional Information: n/a

## 2018-12-19 NOTE — TELEPHONE ENCOUNTER
"Evelyn Greenberg I. Chief Complaints during this visit:  f/u Patient visit for  PSP     Primary Care Physician  Asia Betancourt MD  [address] [facility] [address]      VIDEO VISIT (by wanda LOPEZ due to technical problems with [employer])    History of present illness:   68 y.o.  W seen in f/u for PSP.  Family are concerned about her pain in legs and falls.  Pain in thighs, comes and goes.    Someone with with her at all times, but legs "give out."  They ask about PT, but also question that it would be effective.    On hospice.    Interval history 8/31/18:  Accompanied by daughters.  Falling more and even broke 2 toes. This only occurs when getting out of bed to use bathroom without calling for help.  They have baby monitors, but also now a motion sensor to catch her when she gets up.  Has 3 sitters for around the clock.  Unable to fit a wheelchair in her trailer home.  Travel to  today was extremely difficult for all.   Main concern is hallucinations and delusions that her  is around.  Incontinence worsening.  Choking continues, but no heimlich needed.  neudexta has not helped with the pba.  Rare restless legs.    Interval history 5/16/18:  Accompanied by daughters.  She no longer cries easily, but laughs easily.  They are interested in trying the neudexta now.  She says she feels sinemet has helped her, but daughters don't appreciate.  She falls about 2x/week.  Needs shoulder surgery, but family fearful about confusion after surgery.  Choking frequently, but not losing weight.      From initial consult 3/13/18:  consultation at the request of  Dr. Jimenez for evaluation of parkinsonism.  She came to [facility] for neuropsych testing (sent by Dr. Aguillon), but our neuropsychologist, Dr. Jimenez, was concerned about stroke, sent her directly to one of our stroke docs who ruled this out with MRI, but immediately appreciated her parkinsonism and asked me to see patient.    Briefly (as diagnosis was " immediately clear upon entering room), symptoms started about 2 years ago with memory and cognition, then a year ago with falls.  She lives alone and has thus far, not agreed to live with a daughter or enter a home.  She also cries or laughs easily.    II.  Review of systems:  As in HPI,  otherwise, balance 3 systems reviewed and are negative.    III.  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     Alpha-1 antitrypsin heterozygote     Mitral regurgitation     Obstructive sleep apnea (adult) (pediatric)     PONV (postoperative nausea and vomiting)     Pulmonary hypertension 2/25/2013    Respiratory distress 4/10/2014    Varices, esophageal      Family History   Problem Relation Age of Onset    Cirrhosis Mother     COPD Father     Heart disease Father     Breast cancer Neg Hx     Ovarian cancer Neg Hx      Social History     Socioeconomic History    Marital status:      Spouse name: Bon         Current Outpatient Medications on File Prior to Visit   Medication Sig Dispense Refill    amLODIPine (NORVASC) 10 MG tablet Take 10 mg by mouth once daily.      ascorbic acid, vitamin C, (VITAMIN C) 500 MG tablet Take 500 mg by mouth once daily.      aspirin (ECOTRIN) 81 MG EC tablet Take 81 mg by mouth every morning.       carbidopa-levodopa  mg (SINEMET)  mg per tablet Take 1 tablet by mouth 3 (three) times daily. 270 tablet 3    cyanocobalamin, vitamin B-12, 1,500 mcg TbDL Take 1,500 mcg by mouth once daily. (Patient taking differently: Take 1,500 mcg by mouth every morning. )       dextromethorphan-quinidine 20-10 mg (NUEDEXTA) 20-10 mg per capsule Take 1 capsule by mouth once daily. 90 capsule 4    ERGOCALCIFEROL, VITAMIN D2, (VITAMIN D ORAL) Take 1,000 Units by mouth every morning.       fish oil-omega-3 fatty acids 300-1,000 mg capsule Take 1 capsule by mouth once daily.      folic acid (FOLVITE) 1 MG tablet Take 1 tablet (1 mg total) by mouth once daily. (Patient taking differently: Take 1 mg by mouth every morning. ) 30 tablet 5    furosemide (LASIX) 20 MG tablet Take 1 tablet (20 mg total) by mouth once daily. 30 tablet 1    lisinopril 10 MG tablet Take 1 tablet (10 mg total) by mouth every evening. 30 tablet 11    magnesium oxide (MAG-OX) 400 mg tablet Take 1 tablet (400 mg total) by mouth once daily. (Patient taking differently: Take 400 mg by mouth every morning. ) 30 tablet 3    metoprolol tartrate (LOPRESSOR) 25 MG tablet Take 25 mg by mouth 2 (two) times daily.      multivitamin (THERAGRAN) per tablet Take 1 tablet by mouth every morning.       mycophenolate (CELLCEPT) 250 mg Cap Take 2 capsules (500 mg total) by mouth 2 (two) times daily. 120 capsule 11    omeprazole (PRILOSEC) 40 MG capsule Take 1 capsule (40 mg total) by mouth once daily. (Patient taking differently: Take 40 mg by mouth once daily. Takes at 12 noon) 30 capsule 3    tacrolimus (PROGRAF) 1 MG Cap Take 2 mg in am and 1 mg in pm 90 capsule 11    venlafaxine (EFFEXOR-XR) 37.5 MG 24 hr capsule Take 37.5 mg by mouth once daily.        No current facility-administered medications on file prior to visit.      Past medications tried:  requip (for RLS) caused crying    Review of patient's allergies indicates:  No Known Allergies    IV. Physical Exam  VIDEO VISIT  General appearance: Well nourished, well developed, no acute distress.                  -------------------------------------------------------------  Facial Expression: 1: Slight: Minimal masked facies manifested only by decreased frequency of  blinking.        Affect: full, inappropriately giggly       Orientation to time & place:  Oriented to time, place, person and situation       Attention & concentration:  Normal attention span and concentration       Memory:  Not tested  Language: Spontaneous, fluent; able to repeat and name objects        Fund of knowledge:  Aware of current events        Speech:  normal (not dysarthric)  -------------------------------------------------------  Cranial nerves: extraocular movements markedly apraxic     Abnormality of movement (bradykinesia, hyperkinesia) present? Yes, 2: Mild: Mild global slowness and poverty of spontaneous movements.     Tremor present?   No         V.  Laboratory/ Radiological Data:          Lab Results   Component Value Date    TSH 3.985 04/06/2014     No results found for: [id number]  Lab Results   Component Value Date    ALT 12 09/17/2015    AST 25 09/17/2015    GGT 16 03/30/2015    ALKPHOS 81 09/17/2015    BILITOT 0.7 09/17/2015      Mild general atrophy, moderate midline cerebellar atrophy.  Midbrain is normal.          VI. Assessment and Plan      1.  psp- nothing by palliative care to offer.  Advised to keep her wheelchair-bound unless 100% supervised.  2.  Pain in thighs- start gabapentin        No Follow-up on file.

## 2019-01-01 ENCOUNTER — TELEPHONE (OUTPATIENT)
Dept: TRANSPLANT | Facility: CLINIC | Age: 69
End: 2019-01-01

## 2019-01-01 ENCOUNTER — PATIENT MESSAGE (OUTPATIENT)
Dept: NEUROLOGY | Facility: CLINIC | Age: 69
End: 2019-01-01

## 2019-01-01 ENCOUNTER — PATIENT MESSAGE (OUTPATIENT)
Dept: TRANSPLANT | Facility: CLINIC | Age: 69
End: 2019-01-01

## 2019-03-29 NOTE — LETTER
March 7, 2017        Pako Diez  [address] [facility] [address]  Phone: [phone]     Asia Betancourt  [address] [facility] [address]  Phone: [phone]  Fax: [phone]             [facility]  [address]  [facility] [address]  Phone: [phone]   Patient: Evelyn Greenberg   MR Number: [id number]   YOB: 1950   Date of Visit: 3/6/2017       Dear Dr. Pako Diez, Asia Betancourt    Thank you for referring Evelyn Greenberg to me for evaluation. Attached you will find relevant portions of my assessment and plan of care.    If you have questions, please do not hesitate to call me. I look forward to following Evelyn Greenberg along with you.    Sincerely,    Estefani Olson MD    Enclosure    If you would like to receive this communication electronically, please contact [email or web address] or [phone] to request [facility] [employer] access.    [facility] is a tool which provides read-only access to select patient information with whom you have a relationship. Its easy to use and provides real time access to review your patients record including encounter summaries, notes, results, and demographic information.    If you feel you have received this communication in error or would no longer like to receive these types of communications, please e-mail [email or web address]       
negative...

## 2019-04-30 NOTE — ASSESSMENT & PLAN NOTE
Recommend UA when she sees cardiology   Worsening symptoms as expected for diagnosis, falling, choking and minor hallucinations.   -> advised transport chair (wheelchair won't fit through her doorways)   -> HH   -> continue sinemet, but advised that she can always experiment with lower doses as the benefits are entirely subjective.

## 2019-05-24 ENCOUNTER — PATIENT MESSAGE (OUTPATIENT)
Dept: TRANSPLANT | Facility: CLINIC | Age: 69
End: 2019-05-24

## 2019-05-24 ENCOUNTER — PATIENT MESSAGE (OUTPATIENT)
Dept: NEUROLOGY | Facility: CLINIC | Age: 69
End: 2019-05-24

## 2019-06-13 ENCOUNTER — POST MORTEM DOCUMENTATION (OUTPATIENT)
Dept: TRANSPLANT | Facility: CLINIC | Age: 69
End: 2019-06-13

## 2019-11-22 ENCOUNTER — TELEPHONE (OUTPATIENT)
Dept: HEPATOLOGY | Facility: CLINIC | Age: 69
End: 2019-11-22

## 2019-11-22 NOTE — TELEPHONE ENCOUNTER
MA spoke to the pt daughter. She was calling to fing out what blood work her mother had that told her she was positive for Alpha 1 antitrypsin. The blood work is the name of the condition.

## 2022-01-18 NOTE — Clinical Note
Can you call and schedule a feedback to be schedule within 4-weeks with them. They are from out of town. My preference is to review with them in person.  oral

## 2024-04-01 NOTE — PATIENT INSTRUCTIONS
1. Sleep study given fatigue and snoring  2. Repeat colonoscopy in 2018 (had a polyp)  3. Repeat mammogram later this year  4. Repeat pap regularly as well.  5. Repeat bone density at next visit  6. Annual dermatology appt- need to start  7. Do not take potassium supplements  Return in one year   Left Deviation